# Patient Record
Sex: FEMALE | Race: WHITE | NOT HISPANIC OR LATINO | Employment: OTHER | URBAN - METROPOLITAN AREA
[De-identification: names, ages, dates, MRNs, and addresses within clinical notes are randomized per-mention and may not be internally consistent; named-entity substitution may affect disease eponyms.]

---

## 2017-08-04 ENCOUNTER — OFFICE VISIT (OUTPATIENT)
Dept: ORTHOPEDICS | Facility: CLINIC | Age: 57
End: 2017-08-04
Payer: OTHER GOVERNMENT

## 2017-08-04 VITALS
SYSTOLIC BLOOD PRESSURE: 110 MMHG | HEIGHT: 69 IN | WEIGHT: 140 LBS | HEART RATE: 61 BPM | BODY MASS INDEX: 20.73 KG/M2 | DIASTOLIC BLOOD PRESSURE: 62 MMHG

## 2017-08-04 DIAGNOSIS — M17.11 PATELLOFEMORAL ARTHRITIS OF RIGHT KNEE: ICD-10-CM

## 2017-08-04 DIAGNOSIS — S83.241A TEAR OF MEDIAL MENISCUS OF RIGHT KNEE, CURRENT, UNSPECIFIED TEAR TYPE, INITIAL ENCOUNTER: Primary | ICD-10-CM

## 2017-08-04 PROCEDURE — 3008F BODY MASS INDEX DOCD: CPT | Mod: ,,, | Performed by: ORTHOPAEDIC SURGERY

## 2017-08-04 PROCEDURE — 73562 X-RAY EXAM OF KNEE 3: CPT | Mod: RT,,, | Performed by: ORTHOPAEDIC SURGERY

## 2017-08-04 PROCEDURE — 99213 OFFICE O/P EST LOW 20 MIN: CPT | Mod: 25,,, | Performed by: ORTHOPAEDIC SURGERY

## 2017-08-04 RX ORDER — FERROUS SULFATE, DRIED 160(50) MG
1 TABLET, EXTENDED RELEASE ORAL 2 TIMES DAILY WITH MEALS
COMMUNITY

## 2017-08-04 RX ORDER — RIZATRIPTAN BENZOATE 10 MG/1
10 TABLET ORAL
COMMUNITY
End: 2019-08-26

## 2017-08-04 RX ORDER — IRBESARTAN 150 MG/1
75 TABLET ORAL DAILY
COMMUNITY
Start: 2017-06-18

## 2017-08-04 RX ORDER — TIZANIDINE 4 MG/1
1 TABLET ORAL 3 TIMES DAILY
COMMUNITY
Start: 2017-06-29

## 2017-08-04 RX ORDER — VALACYCLOVIR HYDROCHLORIDE 500 MG/1
1 TABLET, FILM COATED ORAL DAILY
COMMUNITY
Start: 2017-07-01

## 2017-08-04 RX ORDER — SERTRALINE HYDROCHLORIDE 100 MG/1
1 TABLET, FILM COATED ORAL DAILY
COMMUNITY
Start: 2017-06-29

## 2017-08-04 RX ORDER — DICYCLOMINE HYDROCHLORIDE 10 MG/1
1 CAPSULE ORAL EVERY OTHER DAY
COMMUNITY
Start: 2017-06-29 | End: 2017-08-04

## 2017-08-04 RX ORDER — PNV NO.95/FERROUS FUM/FOLIC AC 28MG-0.8MG
100 TABLET ORAL DAILY
COMMUNITY
End: 2019-08-26

## 2017-08-04 RX ORDER — FLUTICASONE PROPIONATE 50 MCG
1 SPRAY, SUSPENSION (ML) NASAL DAILY
COMMUNITY
Start: 2017-06-29 | End: 2019-08-26

## 2017-08-04 NOTE — PROGRESS NOTES
Subjective:       Chief Complaint    Chief Complaint   Patient presents with    Follow-up     bilateral knees.  Would like to restart Supartz injections.  This has been an on going issues for many years.       HPI  Leslie Cotto is a 57 y.o.  female who presents Progressive pain in right knee for the last 3 months. Using BCPs and Advil for pain relief. Pain varies from 2/10-10/10 pain levels. No swelling. Has pain with squatting and stairs. Also gets popping and catching in the knee. The pain can stop her in her tracks.        Past History  Past Medical History:   Diagnosis Date    Ganglion cyst     Hypertension     Primary osteoarthritis of left knee      Past Surgical History:   Procedure Laterality Date    excision of bursa  03/17/2016    left knee    HYSTERECTOMY      KNEE ARTHROSCOPY Left 2003     Social History     Social History    Marital status: Single     Spouse name: N/A    Number of children: N/A    Years of education: N/A     Occupational History    Not on file.     Social History Main Topics    Smoking status: Former Smoker     Quit date: 2004    Smokeless tobacco: Never Used    Alcohol use Yes    Drug use: No    Sexual activity: Not on file     Other Topics Concern    Not on file     Social History Narrative    No narrative on file         Medications  Current Outpatient Prescriptions   Medication Sig    calcium-vitamin D3 500 mg(1,250mg) -200 unit per tablet Take 1 tablet by mouth 2 (two) times daily with meals.    cyanocobalamin (VITAMIN B-12) 100 MCG tablet Take 100 mcg by mouth once daily.    fluticasone (FLONASE) 50 mcg/actuation nasal spray 1 spray by Each Nare route once daily.    irbesartan (AVAPRO) 150 MG tablet Take 1 tablet by mouth once daily.    MV-MINERALS/FA/OMEGA 3,6,9 #3 (WOMEN'S 50+ ADVANCED ORAL) Take 1 tablet by mouth once daily.    polycarbophil (FIBERCON) 625 mg tablet Take 625 mg by mouth once daily.    rizatriptan (MAXALT) 10 MG tablet Take 10 mg by  mouth as needed for Migraine.    sertraline (ZOLOFT) 100 MG tablet Take 1 tablet by mouth once daily.    tizanidine (ZANAFLEX) 4 MG tablet Take 1 tablet by mouth 3 (three) times daily.    valacyclovir (VALTREX) 500 MG tablet Take 1 tablet by mouth once daily.     No current facility-administered medications for this visit.        Allergies  Review of patient's allergies indicates:  No Known Allergies      Review of Systems     Constitutional: Negative    HENT: Negative  Eyes: Negative  Respiratory: Negative  Cardiovascular: Negative  Musculoskeletal: HPI  Skin: Negative  Neurological: Negative  Hematological: Negative  Endocrine: Negative      Physical Exam    Vitals:    08/04/17 0819   BP: 110/62   Pulse: 61     Physical Examination:Right knee-0-135° range of motion. Neutral alignment. Tender medial joint line. Pain with flexion rotation. Stable knee. Moderate patellofemoral crepitance. More so on the right than the left.     Skin-clear  General appearance -  well appearing, and in no distress  Mental status - awake  Neck - supple  Chest -  symmetric air entry  Heart - normal rate   Abdomen - soft      Assessment/Plan   Tear of medial meniscus of right knee, current, unspecified tear type, initial encounter  -     X-Ray Knee 3 View Right; Future; Expected date: 08/04/2017    Patellofemoral arthritis of right knee  -     X-Ray Knee 3 View Right; Future; Expected date: 08/04/2017      Repeat x-rays of her knees reveals normal. Cartilage spaces with mild degenerative change at the patellofemoral joints. The cartilage space and the patellofemoral joints look pretty good also. There is slight narrowing of the cartilage space on the left patellofemoral joint. MRI is to be scheduled for the right knee. It appears she may have a meniscal tear. She has not been able to exercise regularly as previously. Because of not feeling well.She will also like to restart bilateral Supartz injections. If the MRI of her right knee  does not reveal a surgical issue with the meniscus.      This note was dictated using voice recognition software and may contain grammatical errors.

## 2017-08-04 NOTE — LETTER
August 4, 2017      Salinas Craig MD  [address]  [address]  [place] [address]           [facility]  [address]  [address]  [place] [address]  Phone: [phone]  Fax: [phone]          Patient: Leslie Cotto   MR Number: [id number]   YOB: 1960   Date of Visit: 8/4/2017       Dear Dr. Salinas Craig:    Thank you for referring Leslie Cotto to me for evaluation. Attached you will find relevant portions of my assessment and plan of care.    If you have questions, please do not hesitate to call me. I look forward to following Leslie Cotto along with you.    Sincerely,    Raul Barker Jr., MD    Enclosure  CC:  No Recipients    If you would like to receive this communication electronically, please contact [email or web address] or [phone] to request more information on [employer] Link access.    For providers and/or their staff who would like to refer a patient to [facility], please contact us through our one-stop-shop provider referral line, [facility], at [phone].    If you feel you have received this communication in error or would no longer like to receive these types of communications, please e-mail [email or web address]

## 2017-08-15 ENCOUNTER — TELEPHONE (OUTPATIENT)
Dept: ORTHOPEDICS | Facility: CLINIC | Age: 57
End: 2017-08-15

## 2017-08-15 ENCOUNTER — OFFICE VISIT (OUTPATIENT)
Dept: ORTHOPEDICS | Facility: CLINIC | Age: 57
End: 2017-08-15
Payer: OTHER GOVERNMENT

## 2017-08-15 VITALS
HEART RATE: 59 BPM | DIASTOLIC BLOOD PRESSURE: 85 MMHG | HEIGHT: 69 IN | BODY MASS INDEX: 20.73 KG/M2 | SYSTOLIC BLOOD PRESSURE: 127 MMHG | WEIGHT: 140 LBS

## 2017-08-15 DIAGNOSIS — S83.241D OTHER TEAR OF MEDIAL MENISCUS OF RIGHT KNEE AS CURRENT INJURY, SUBSEQUENT ENCOUNTER: ICD-10-CM

## 2017-08-15 DIAGNOSIS — S83.281D OTHER TEAR OF LATERAL MENISCUS OF RIGHT KNEE, UNSPECIFIED WHETHER OLD OR CURRENT TEAR, SUBSEQUENT ENCOUNTER: ICD-10-CM

## 2017-08-15 DIAGNOSIS — S83.241A TEAR OF MEDIAL MENISCUS OF RIGHT KNEE, CURRENT, UNSPECIFIED TEAR TYPE, INITIAL ENCOUNTER: Primary | ICD-10-CM

## 2017-08-15 PROBLEM — S83.281A TEAR OF LATERAL MENISCUS OF RIGHT KNEE: Status: ACTIVE | Noted: 2017-08-15

## 2017-08-15 PROCEDURE — 99213 OFFICE O/P EST LOW 20 MIN: CPT | Mod: ,,, | Performed by: ORTHOPAEDIC SURGERY

## 2017-08-15 PROCEDURE — 3008F BODY MASS INDEX DOCD: CPT | Mod: ,,, | Performed by: ORTHOPAEDIC SURGERY

## 2017-08-15 NOTE — LETTER
August 15, 2017      Salinas Craig MD  [address]  [address]  [place] [address]           [facility]  [address]  [address]  [place] [address]  Phone: [phone]  Fax: [phone]          Patient: Leslie Cotto   MR Number: [id number]   YOB: 1960   Date of Visit: 8/15/2017       Dear Dr. Salinas Craig:    Thank you for referring Leslie Cotto to me for evaluation. Attached you will find relevant portions of my assessment and plan of care.    If you have questions, please do not hesitate to call me. I look forward to following Leslie Cotto along with you.    Sincerely,    Raul Barker Jr., MD    Enclosure  CC:  No Recipients    If you would like to receive this communication electronically, please contact [email or web address] or [phone] to request more information on [employer] Link access.    For providers and/or their staff who would like to refer a patient to [facility], please contact us through our one-stop-shop provider referral line, [facility], at [phone].    If you feel you have received this communication in error or would no longer like to receive these types of communications, please e-mail [email or web address]

## 2017-08-15 NOTE — PROGRESS NOTES
Subjective:       Chief Complaint    Chief Complaint   Patient presents with    Results     Patient is here to review her MRI of the right knee performed on 8/9/17 @ [facility].       HPI  Leslie Cotto is a 57 y.o.  female who presents Follow-up on MRI of the right knee which showed an oblique tear of the medial meniscus with a radial tear of the lateral meniscus. She has been scheduled for arthroscopy on August 31. Preop will be August 30 on Wednesday.      Past History  Past Medical History:   Diagnosis Date    Ganglion cyst     Hypertension     Primary osteoarthritis of left knee      Past Surgical History:   Procedure Laterality Date    excision of bursa  03/17/2016    left knee    HYSTERECTOMY      KNEE ARTHROSCOPY Left 2003     Social History     Social History    Marital status: Single     Spouse name: N/A    Number of children: N/A    Years of education: N/A     Occupational History    Not on file.     Social History Main Topics    Smoking status: Former Smoker     Quit date: 2004    Smokeless tobacco: Never Used    Alcohol use Yes    Drug use: No    Sexual activity: Not on file     Other Topics Concern    Not on file     Social History Narrative    No narrative on file         Medications  Current Outpatient Prescriptions   Medication Sig    calcium-vitamin D3 500 mg(1,250mg) -200 unit per tablet Take 1 tablet by mouth 2 (two) times daily with meals.    cyanocobalamin (VITAMIN B-12) 100 MCG tablet Take 100 mcg by mouth once daily.    fluticasone (FLONASE) 50 mcg/actuation nasal spray 1 spray by Each Nare route once daily.    irbesartan (AVAPRO) 150 MG tablet Take 1 tablet by mouth once daily.    MV-MINERALS/FA/OMEGA 3,6,9 #3 (WOMEN'S 50+ ADVANCED ORAL) Take 1 tablet by mouth once daily.    polycarbophil (FIBERCON) 625 mg tablet Take 625 mg by mouth once daily.    rizatriptan (MAXALT) 10 MG tablet Take 10 mg by mouth as needed for Migraine.    sertraline (ZOLOFT) 100 MG tablet  Take 1 tablet by mouth once daily.    tizanidine (ZANAFLEX) 4 MG tablet Take 1 tablet by mouth 3 (three) times daily.    valacyclovir (VALTREX) 500 MG tablet Take 1 tablet by mouth once daily.     No current facility-administered medications for this visit.        Allergies  Review of patient's allergies indicates:  No Known Allergies      Review of Systems     Constitutional: Negative    HENT: Negative  Eyes: Negative  Respiratory: Negative  Cardiovascular: Negative  Musculoskeletal: HPI  Skin: Negative  Neurological: Negative  Hematological: Negative  Endocrine: Negative      Physical Exam    Vitals:    08/15/17 1059   BP: 127/85   Pulse: (!) 59     Physical Examination:Right knee range of motion 0 145°. Pain with flexion rotation. No swelling. Stable knee. Mild varus.     Skin-clear  General appearance -  well appearing, and in no distress  Mental status - awake  Neck - supple  Chest -  symmetric air entry  Heart - normal rate   Abdomen - soft      Assessment/Plan   Tear of medial meniscus of right knee, current, unspecified tear type, initial encounter    Other tear of lateral meniscus of right knee, unspecified whether old or current tear, subsequent encounter    Other tear of medial meniscus of right knee as current injury, subsequent encounter      Review of the MRI showed an oblique tear. The medial meniscus, right knee. Noted was a small radial tear on the anterior horn of the lateral meniscus. Surgery scheduled August 31. Preop August 30.       This note was dictated using voice recognition software and may contain grammatical errors.

## 2017-08-15 NOTE — TELEPHONE ENCOUNTER
Please add visits to referral # [id number] and schedule appropriate number of visits for bilateral Supartz.

## 2017-08-17 NOTE — TELEPHONE ENCOUNTER
Supartz on hold for the left knee at this time due to having surgery on the right knee on 08/31/2017. Will consider scheduling Supartz to the left knee at a later date.

## 2017-08-21 DIAGNOSIS — S83.241A TEAR OF MEDIAL MENISCUS OF RIGHT KNEE: Primary | ICD-10-CM

## 2017-08-21 DIAGNOSIS — S83.281A TEAR OF LATERAL MENISCUS OF RIGHT KNEE: ICD-10-CM

## 2017-08-30 ENCOUNTER — OFFICE VISIT (OUTPATIENT)
Dept: ORTHOPEDICS | Facility: CLINIC | Age: 57
End: 2017-08-30
Payer: OTHER GOVERNMENT

## 2017-08-30 VITALS
DIASTOLIC BLOOD PRESSURE: 80 MMHG | HEART RATE: 65 BPM | BODY MASS INDEX: 20.73 KG/M2 | SYSTOLIC BLOOD PRESSURE: 128 MMHG | HEIGHT: 69 IN | WEIGHT: 140 LBS

## 2017-08-30 DIAGNOSIS — S83.231D COMPLEX TEAR OF MEDIAL MENISCUS OF RIGHT KNEE, UNSPECIFIED WHETHER OLD OR CURRENT TEAR, SUBSEQUENT ENCOUNTER: Primary | ICD-10-CM

## 2017-08-30 PROBLEM — S83.231A COMPLEX TEAR OF MEDIAL MENISCUS OF RIGHT KNEE: Status: ACTIVE | Noted: 2017-08-30

## 2017-08-30 LAB
ALBUMIN SERPL-MCNC: 4.3 G/DL (ref 3.1–4.7)
ALP SERPL-CCNC: 59 IU/L (ref 40–104)
ALT (SGPT): 21 IU/L (ref 3–33)
AST SERPL-CCNC: 31 IU/L (ref 10–40)
BILIRUB SERPL-MCNC: 0.9 MG/DL (ref 0.3–1)
BUN SERPL-MCNC: 10 MG/DL (ref 8–20)
CALCIUM SERPL-MCNC: 9.8 MG/DL (ref 7.7–10.4)
CHLORIDE: 101 MMOL/L (ref 98–110)
CO2 SERPL-SCNC: 29.1 MMOL/L (ref 22.8–31.6)
CREATININE: 0.66 MG/DL (ref 0.6–1.4)
GLUCOSE: 103 MG/DL (ref 70–99)
HCT VFR BLD AUTO: 44.9 % (ref 36–48)
HGB BLD-MCNC: 14.9 G/DL (ref 12–15)
MCH RBC QN AUTO: 31.2 PG (ref 25–35)
MCHC RBC AUTO-ENTMCNC: 33.2 G/DL (ref 31–36)
MCV RBC AUTO: 93.9 FL (ref 79–98)
NUCLEATED RBCS: 0 %
PLATELET # BLD AUTO: 313 K/UL (ref 140–440)
POTASSIUM SERPL-SCNC: 4.3 MMOL/L (ref 3.5–5)
PROT SERPL-MCNC: 7.1 G/DL (ref 6–8.2)
RBC # BLD AUTO: 4.78 M/UL (ref 3.5–5.5)
SODIUM: 138 MMOL/L (ref 134–144)
WBC # BLD AUTO: 6.6 K/UL (ref 5–10)

## 2017-08-30 PROCEDURE — 99499 UNLISTED E&M SERVICE: CPT | Mod: ,,, | Performed by: ORTHOPAEDIC SURGERY

## 2017-08-30 RX ORDER — OXYCODONE AND ACETAMINOPHEN 5; 325 MG/1; MG/1
1 TABLET ORAL EVERY 4 HOURS PRN
Qty: 60 TABLET | Refills: 0 | Status: SHIPPED | OUTPATIENT
Start: 2017-08-30 | End: 2017-09-08

## 2017-08-30 NOTE — PROGRESS NOTES
Subjective:       Chief Complaint    Chief Complaint   Patient presents with    Pre-op Exam     Patient is scheduled for an arthroscopy right knee w/ possible meniscal repair on 8/31/2017 w/ Dr. Barker.         HPI  Leslie Cotto is a 57 y.o.  female who presents Preop for arthroscopy, right knee. 12 point review of systems negative. Procedure discussed.      Past History  Past Medical History:   Diagnosis Date    Ganglion cyst     Hypertension     Primary osteoarthritis of left knee      Past Surgical History:   Procedure Laterality Date    excision of bursa  03/17/2016    left knee    HYSTERECTOMY      KNEE ARTHROSCOPY Left 2003     Social History     Social History    Marital status: Single     Spouse name: N/A    Number of children: N/A    Years of education: N/A     Occupational History    Not on file.     Social History Main Topics    Smoking status: Former Smoker     Quit date: 2004    Smokeless tobacco: Never Used    Alcohol use Yes    Drug use: No    Sexual activity: Not on file     Other Topics Concern    Not on file     Social History Narrative    No narrative on file         Medications  Current Outpatient Prescriptions   Medication Sig    calcium-vitamin D3 500 mg(1,250mg) -200 unit per tablet Take 1 tablet by mouth 2 (two) times daily with meals.    cyanocobalamin (VITAMIN B-12) 100 MCG tablet Take 100 mcg by mouth once daily.    fluticasone (FLONASE) 50 mcg/actuation nasal spray 1 spray by Each Nare route once daily.    irbesartan (AVAPRO) 150 MG tablet Take 1 tablet by mouth once daily.    MV-MINERALS/FA/OMEGA 3,6,9 #3 (WOMEN'S 50+ ADVANCED ORAL) Take 1 tablet by mouth once daily.    polycarbophil (FIBERCON) 625 mg tablet Take 625 mg by mouth once daily.    rizatriptan (MAXALT) 10 MG tablet Take 10 mg by mouth as needed for Migraine.    sertraline (ZOLOFT) 100 MG tablet Take 1 tablet by mouth once daily.    tizanidine (ZANAFLEX) 4 MG tablet Take 1 tablet by mouth 3  (three) times daily.    valacyclovir (VALTREX) 500 MG tablet Take 1 tablet by mouth once daily.    oxycodone-acetaminophen (PERCOCET) 5-325 mg per tablet Take 1 tablet by mouth every 4 (four) hours as needed for Pain.     No current facility-administered medications for this visit.        Allergies  Review of patient's allergies indicates:  No Known Allergies      Review of Systems     Constitutional: Negative    HENT: Negative  Eyes: Negative  Respiratory: Negative  Cardiovascular: Negative  Musculoskeletal: HPI  Skin: Negative  Neurological: Negative  Hematological: Negative  Endocrine: Negative      Physical Exam    Vitals:    08/30/17 0954   BP: 128/80   Pulse: 65     Physical Examination:Range of motion right knee 0-145°. Mild tenderness at the posterior medial joint line. Mild bogginess. Stable knee. Pain with flexion rotation. Pain with squatting.     Skin-clear  General appearance -  well appearing, and in no distress  Mental status - awake  Neck - supple  Chest -  symmetric air entry  Heart - normal rate   Abdomen - soft      Assessment/Plan   Complex tear of medial meniscus of right knee, unspecified whether old or current tear, subsequent encounter    Other orders  -     oxycodone-acetaminophen (PERCOCET) 5-325 mg per tablet; Take 1 tablet by mouth every 4 (four) hours as needed for Pain.  Dispense: 60 tablet; Refill: 0      Tendency to be nauseated with anesthesia. Schedule first case 08/31/2017      This note was dictated using voice recognition software and may contain grammatical errors.

## 2017-09-01 ENCOUNTER — OFFICE VISIT (OUTPATIENT)
Dept: ORTHOPEDICS | Facility: CLINIC | Age: 57
End: 2017-09-01
Payer: OTHER GOVERNMENT

## 2017-09-01 VITALS
WEIGHT: 140 LBS | BODY MASS INDEX: 20.73 KG/M2 | HEIGHT: 69 IN | SYSTOLIC BLOOD PRESSURE: 141 MMHG | DIASTOLIC BLOOD PRESSURE: 86 MMHG | HEART RATE: 57 BPM

## 2017-09-01 DIAGNOSIS — M17.11 PATELLOFEMORAL ARTHRITIS OF RIGHT KNEE: ICD-10-CM

## 2017-09-01 DIAGNOSIS — Z98.890 POST-OPERATIVE STATE: ICD-10-CM

## 2017-09-01 DIAGNOSIS — S83.241A TEAR OF MEDIAL MENISCUS OF RIGHT KNEE, CURRENT, UNSPECIFIED TEAR TYPE, INITIAL ENCOUNTER: Primary | ICD-10-CM

## 2017-09-01 PROCEDURE — 99024 POSTOP FOLLOW-UP VISIT: CPT | Mod: ,,, | Performed by: ORTHOPAEDIC SURGERY

## 2017-09-01 NOTE — PROGRESS NOTES
Subjective:       Chief Complaint    Chief Complaint   Patient presents with    Right Knee - Post-op Evaluation     Post-op 1 day. DOI: 8/31/2017, arthroscopic partial medial meniscectomy, right knee.  # 2 Arthroscopic chondroplasty of the patellofemoral joint and medial femoral condyle, right knee. She is currently wearing a knee brace and using crutches.       HPI  Leslie Cotto is a 57 y.o.  female who presentsFollow-up on arthroscopic partial medial meniscectomy of the right knee. She is 24 hours. There is to be doing fairly well. Pain level 4/10.       Past History  Past Medical History:   Diagnosis Date    Ganglion cyst     Hypertension     Primary osteoarthritis of left knee      Past Surgical History:   Procedure Laterality Date    excision of bursa  03/17/2016    left knee    HYSTERECTOMY      KNEE ARTHROSCOPY Left 2003     Social History     Social History    Marital status: Single     Spouse name: N/A    Number of children: N/A    Years of education: N/A     Occupational History    Not on file.     Social History Main Topics    Smoking status: Former Smoker     Quit date: 2004    Smokeless tobacco: Never Used    Alcohol use Yes    Drug use: No    Sexual activity: Not on file     Other Topics Concern    Not on file     Social History Narrative    No narrative on file         Medications  Current Outpatient Prescriptions   Medication Sig    calcium-vitamin D3 500 mg(1,250mg) -200 unit per tablet Take 1 tablet by mouth 2 (two) times daily with meals.    cyanocobalamin (VITAMIN B-12) 100 MCG tablet Take 100 mcg by mouth once daily.    fluticasone (FLONASE) 50 mcg/actuation nasal spray 1 spray by Each Nare route once daily.    irbesartan (AVAPRO) 150 MG tablet Take 1 tablet by mouth once daily.    MV-MINERALS/FA/OMEGA 3,6,9 #3 (WOMEN'S 50+ ADVANCED ORAL) Take 1 tablet by mouth once daily.    oxycodone-acetaminophen (PERCOCET) 5-325 mg per tablet Take 1 tablet by mouth every 4 (four)  hours as needed for Pain.    polycarbophil (FIBERCON) 625 mg tablet Take 625 mg by mouth once daily.    rizatriptan (MAXALT) 10 MG tablet Take 10 mg by mouth as needed for Migraine.    sertraline (ZOLOFT) 100 MG tablet Take 1 tablet by mouth once daily.    tizanidine (ZANAFLEX) 4 MG tablet Take 1 tablet by mouth 3 (three) times daily.    valacyclovir (VALTREX) 500 MG tablet Take 1 tablet by mouth once daily.     No current facility-administered medications for this visit.        Allergies  Review of patient's allergies indicates:  No Known Allergies      Review of Systems     Constitutional: Negative    HENT: Negative  Eyes: Negative  Respiratory: Negative  Cardiovascular: Negative  Musculoskeletal: HPI  Skin: Negative  Neurological: Negative  Hematological: Negative  Endocrine: Negative      Physical Exam    Vitals:    09/01/17 1034   BP: (!) 141/86   Pulse: (!) 57     Physical Examination:Right knee range of motion 0-60°. Mild swelling. Tenderness as expected. No drainage.     Skin-Clear  General appearance -  well appearing, and in no distress  Mental status - awake  Neck - supple  Chest -  symmetric air entry  Heart - normal rate   Abdomen - soft      Assessment/Plan   Tear of medial meniscus of right knee, current, unspecified tear type, initial encounter    Patellofemoral arthritis of right knee    Post-operative state      Discontinue knee immobilizer, weightbearing as tolerated. Okay to with the knee. Suture removal in one week.      This note was dictated using voice recognition software and may contain grammatical errors.

## 2017-09-08 ENCOUNTER — OFFICE VISIT (OUTPATIENT)
Dept: ORTHOPEDICS | Facility: CLINIC | Age: 57
End: 2017-09-08
Payer: OTHER GOVERNMENT

## 2017-09-08 VITALS
HEART RATE: 69 BPM | BODY MASS INDEX: 20.73 KG/M2 | SYSTOLIC BLOOD PRESSURE: 116 MMHG | DIASTOLIC BLOOD PRESSURE: 62 MMHG | WEIGHT: 140 LBS | HEIGHT: 69 IN

## 2017-09-08 DIAGNOSIS — Z98.890 POST-OPERATIVE STATE: Primary | ICD-10-CM

## 2017-09-08 DIAGNOSIS — S83.231D COMPLEX TEAR OF MEDIAL MENISCUS OF RIGHT KNEE, UNSPECIFIED WHETHER OLD OR CURRENT TEAR, SUBSEQUENT ENCOUNTER: ICD-10-CM

## 2017-09-08 PROCEDURE — 99024 POSTOP FOLLOW-UP VISIT: CPT | Mod: ,,, | Performed by: ORTHOPAEDIC SURGERY

## 2017-09-08 NOTE — PROGRESS NOTES
Subjective:       Chief Complaint    Chief Complaint   Patient presents with    Post-op Evaluation     Post-op 8 days. DOI: 8/31/2017, arthroscopic partial medial meniscectomy, right knee.  # 2 Arthroscopic chondroplasty of the patellofemoral joint and medial femoral condyle, right knee.  Still has some swelling but she is doing well.         ANN MARIE Cotto is a 57 y.o.  female who presents Follow-up from the arthroscopy 1 week. She was a little concerned of the mass behind her knee, which is the popliteal cyst. Sutures were removed.      Past History  Past Medical History:   Diagnosis Date    Ganglion cyst     Hypertension     Primary osteoarthritis of left knee      Past Surgical History:   Procedure Laterality Date    excision of bursa  03/17/2016    left knee    HYSTERECTOMY      KNEE ARTHROSCOPY Left 2003     Social History     Social History    Marital status: Single     Spouse name: N/A    Number of children: N/A    Years of education: N/A     Occupational History    Not on file.     Social History Main Topics    Smoking status: Former Smoker     Quit date: 2004    Smokeless tobacco: Never Used    Alcohol use Yes    Drug use: No    Sexual activity: Not on file     Other Topics Concern    Not on file     Social History Narrative    No narrative on file         Medications  Current Outpatient Prescriptions   Medication Sig    calcium-vitamin D3 500 mg(1,250mg) -200 unit per tablet Take 1 tablet by mouth 2 (two) times daily with meals.    cyanocobalamin (VITAMIN B-12) 100 MCG tablet Take 100 mcg by mouth once daily.    fluticasone (FLONASE) 50 mcg/actuation nasal spray 1 spray by Each Nare route once daily.    irbesartan (AVAPRO) 150 MG tablet Take 1 tablet by mouth once daily.    MV-MINERALS/FA/OMEGA 3,6,9 #3 (WOMEN'S 50+ ADVANCED ORAL) Take 1 tablet by mouth once daily.    polycarbophil (FIBERCON) 625 mg tablet Take 625 mg by mouth once daily.    rizatriptan (MAXALT) 10 MG tablet  Take 10 mg by mouth as needed for Migraine.    sertraline (ZOLOFT) 100 MG tablet Take 1 tablet by mouth once daily.    tizanidine (ZANAFLEX) 4 MG tablet Take 1 tablet by mouth 3 (three) times daily.    valacyclovir (VALTREX) 500 MG tablet Take 1 tablet by mouth once daily.     No current facility-administered medications for this visit.        Allergies  Review of patient's allergies indicates:  No Known Allergies      Review of Systems     Constitutional: Negative    HENT: Negative  Eyes: Negative  Respiratory: Negative  Cardiovascular: Negative  Musculoskeletal: HPI  Skin: Negative  Neurological: Negative  Hematological: Negative  Endocrine: Negative      Physical Exam    Vitals:    09/08/17 0818   BP: 116/62   Pulse: 69     Physical Examination:     Skin-  General appearance -  well appearing, and in no distress  Mental status - awake  Neck - supple  Chest -  symmetric air entry  Heart - normal rate   Abdomen - soft      Assessment/Plan   Post-operative state    Complex tear of medial meniscus of right knee, unspecified whether old or current tear, subsequent encounter    Urged to begin core exercising with a stationary bike and/or elliptical. She is to avoid a treadmill. Must also avoid squats knee extensions and leg presses.        This note was dictated using voice recognition software and may contain grammatical errors.

## 2017-09-26 ENCOUNTER — OFFICE VISIT (OUTPATIENT)
Dept: ORTHOPEDICS | Facility: CLINIC | Age: 57
End: 2017-09-26
Payer: OTHER GOVERNMENT

## 2017-09-26 VITALS — BODY MASS INDEX: 20.73 KG/M2 | HEIGHT: 69 IN | WEIGHT: 140 LBS

## 2017-09-26 DIAGNOSIS — M17.12 PRIMARY OSTEOARTHRITIS OF LEFT KNEE: Primary | ICD-10-CM

## 2017-09-26 PROCEDURE — 99499 UNLISTED E&M SERVICE: CPT | Mod: ,,, | Performed by: ORTHOPAEDIC SURGERY

## 2017-09-26 PROCEDURE — 20610 DRAIN/INJ JOINT/BURSA W/O US: CPT | Mod: LT,,, | Performed by: ORTHOPAEDIC SURGERY

## 2017-09-26 RX ORDER — CHLORHEXIDINE GLUCONATE ORAL RINSE 1.2 MG/ML
SOLUTION DENTAL DAILY
COMMUNITY
Start: 2017-09-14 | End: 2019-08-26

## 2017-09-26 NOTE — LETTER
September 26, 2017      Salinas Craig MD  [address]  [address]  [place] [address]           [facility]  [address]  [address]  [place] [address]  Phone: [phone]  Fax: [phone]          Patient: Leslie Cotto   MR Number: [id number]   YOB: 1960   Date of Visit: 9/26/2017       Dear Dr. Salinas Craig:    Thank you for referring Leslie Cotto to me for evaluation. Attached you will find relevant portions of my assessment and plan of care.    If you have questions, please do not hesitate to call me. I look forward to following Leslie Cotto along with you.    Sincerely,    Raul Barker Jr., MD    Enclosure  CC:  No Recipients    If you would like to receive this communication electronically, please contact [email or web address] or [phone] to request more information on [employer] Link access.    For providers and/or their staff who would like to refer a patient to [facility], please contact us through our one-stop-shop provider referral line, [facility] [place], at [phone].    If you feel you have received this communication in error or would no longer like to receive these types of communications, please e-mail [email or web address]

## 2017-09-26 NOTE — PROGRESS NOTES
Postop follow-up arthroscopy of the right knee on 8:30 Subjective:       Chief Complaint    Chief Complaint   Patient presents with    Injections     # 1 left knee Supartz.       HPI  Leslie Cotto is a 57 y.o.  female who presents Follow-up arthroscopy right knee, 08/31/2017. Upper extent of the medial meniscus was encountered that time and partial meniscectomy was accomplished. She also has a sig no fix and popliteal cyst in that area measuring 5.6 cm x 3.1 cm at its largest point. She is currently rehabbing the knee with stationary bike, which she rides half hour every other day      Past History  Past Medical History:   Diagnosis Date    Ganglion cyst     Hypertension     Primary osteoarthritis of left knee      Past Surgical History:   Procedure Laterality Date    excision of bursa  03/17/2016    left knee    HYSTERECTOMY      KNEE ARTHROSCOPY Left 2003     Social History     Social History    Marital status: Single     Spouse name: N/A    Number of children: N/A    Years of education: N/A     Occupational History    Not on file.     Social History Main Topics    Smoking status: Former Smoker     Quit date: 2004    Smokeless tobacco: Never Used    Alcohol use Yes    Drug use: No    Sexual activity: Not on file     Other Topics Concern    Not on file     Social History Narrative    No narrative on file         Medications  Current Outpatient Prescriptions   Medication Sig    calcium-vitamin D3 500 mg(1,250mg) -200 unit per tablet Take 1 tablet by mouth 2 (two) times daily with meals.    chlorhexidine (PERIDEX) 0.12 % solution once daily.    cyanocobalamin (VITAMIN B-12) 100 MCG tablet Take 100 mcg by mouth once daily.    fluticasone (FLONASE) 50 mcg/actuation nasal spray 1 spray by Each Nare route once daily.    irbesartan (AVAPRO) 150 MG tablet Take 1 tablet by mouth once daily.    MV-MINERALS/FA/OMEGA 3,6,9 #3 (WOMEN'S 50+ ADVANCED ORAL) Take 1 tablet by mouth once daily.     polycarbophil (FIBERCON) 625 mg tablet Take 625 mg by mouth once daily.    rizatriptan (MAXALT) 10 MG tablet Take 10 mg by mouth as needed for Migraine.    sertraline (ZOLOFT) 100 MG tablet Take 1 tablet by mouth once daily.    tizanidine (ZANAFLEX) 4 MG tablet Take 1 tablet by mouth 3 (three) times daily.    valacyclovir (VALTREX) 500 MG tablet Take 1 tablet by mouth once daily.     Current Facility-Administered Medications   Medication    SUPARTZ injection Syrg 25 mg       Allergies  Review of patient's allergies indicates:  No Known Allergies      Review of Systems     Constitutional: Negative    HENT: Negative  Eyes: Negative  Respiratory: Negative  Cardiovascular: Negative  Musculoskeletal: HPI  Skin: Negative  Neurological: Negative  Hematological: Negative  Endocrine: Negative      Physical Exam    There were no vitals filed for this visit.  Physical Examination: Right knee reveals range of motion 0 130°. Mild tenderness. Patellofemoral crepitance is present. The popliteal cyst is palpable posteriorly       Skin-clear  General appearance -  well appearing, and in no distress  Mental status - awake  Neck - supple  Chest -  symmetric air entry  Heart - normal rate   Abdomen - soft      Assessment/Plan   Primary osteoarthritis of left knee  -     Large Joint Aspiration/Injection  -     SUPARTZ injection Syrg 25 mg; 25 mg.      Two-view with a stationary bike exercises. Appears to be progressing satisfactorily      This note was dictated using voice recognition software and may contain grammatical errors.

## 2017-09-26 NOTE — PROCEDURES
Large Joint Aspiration/Injection  Date/Time: 9/26/2017 9:37 AM  Performed by: NORA KNAPP JR  Authorized by: NORA KNAPP JR     Consent Done?:  Yes (Verbal)  Indications:  Pain  Procedure site marked: Yes    Timeout: Prior to procedure the correct patient, procedure, and site was verified      Location:  Knee  Site:  L knee  Prep: Patient was prepped and draped in usual sterile fashion    Ultrasonic Guidance for needle placement: No  Needle size:  22 G  Approach:  Lateral  Medications:  25 mg SUPARTZ 10 mg/mL  Patient tolerance:  Patient tolerated the procedure well with no immediate complications

## 2017-10-03 ENCOUNTER — OFFICE VISIT (OUTPATIENT)
Dept: ORTHOPEDICS | Facility: CLINIC | Age: 57
End: 2017-10-03
Payer: OTHER GOVERNMENT

## 2017-10-03 VITALS — BODY MASS INDEX: 20.73 KG/M2 | HEIGHT: 69 IN | WEIGHT: 140 LBS

## 2017-10-03 DIAGNOSIS — M17.12 PRIMARY OSTEOARTHRITIS OF LEFT KNEE: Primary | ICD-10-CM

## 2017-10-03 PROCEDURE — 20610 DRAIN/INJ JOINT/BURSA W/O US: CPT | Mod: LT,,, | Performed by: ORTHOPAEDIC SURGERY

## 2017-10-03 PROCEDURE — 99499 UNLISTED E&M SERVICE: CPT | Mod: ,,, | Performed by: ORTHOPAEDIC SURGERY

## 2017-10-03 NOTE — PROCEDURES
Large Joint Aspiration/Injection  Date/Time: 10/3/2017 4:12 PM  Performed by: NORA KNAPP JR  Authorized by: NORA KNAPP JR     Consent Done?:  Yes (Verbal)  Indications:  Pain  Procedure site marked: Yes    Timeout: Prior to procedure the correct patient, procedure, and site was verified      Location:  Knee  Site:  L knee  Prep: Patient was prepped and draped in usual sterile fashion    Ultrasonic Guidance for needle placement: No  Needle size:  22 G  Approach:  Lateral  Medications:  25 mg SUPARTZ 10 mg/mL  Patient tolerance:  Patient tolerated the procedure well with no immediate complications

## 2017-10-03 NOTE — LETTER
October 3, 2017      Salinas Craig MD  [address]  [address]  [place] [address]           [facility] Orthopedic Surgery  [address]  [address]  [place] [address]  Phone: [phone]  Fax: [phone]          Patient: Leslie Cotto   MR Number: [id number]   YOB: 1960   Date of Visit: 10/3/2017       Dear Dr. Salinas Craig:    Thank you for referring Leslie Cotto to me for evaluation. Attached you will find relevant portions of my assessment and plan of care.    If you have questions, please do not hesitate to call me. I look forward to following Leslie Cotto along with you.    Sincerely,    Raul Barker Jr., MD    Enclosure  CC:  No Recipients    If you would like to receive this communication electronically, please contact [email or web address] or [phone] to request more information on [employer] Link access.    For providers and/or their staff who would like to refer a patient to [facility], please contact us through our one-stop-shop provider referral line, [facility], at [phone].    If you feel you have received this communication in error or would no longer like to receive these types of communications, please e-mail [email or web address]

## 2017-10-10 ENCOUNTER — OFFICE VISIT (OUTPATIENT)
Dept: ORTHOPEDICS | Facility: CLINIC | Age: 57
End: 2017-10-10
Payer: OTHER GOVERNMENT

## 2017-10-10 VITALS — BODY MASS INDEX: 20.73 KG/M2 | WEIGHT: 140 LBS | HEIGHT: 69 IN

## 2017-10-10 DIAGNOSIS — M17.12 PRIMARY OSTEOARTHRITIS OF LEFT KNEE: Primary | ICD-10-CM

## 2017-10-10 PROCEDURE — 20610 DRAIN/INJ JOINT/BURSA W/O US: CPT | Mod: LT,,, | Performed by: ORTHOPAEDIC SURGERY

## 2017-10-10 PROCEDURE — 99499 UNLISTED E&M SERVICE: CPT | Mod: ,,, | Performed by: ORTHOPAEDIC SURGERY

## 2017-10-10 RX ORDER — LOTEPREDNOL ETABONATE 5 MG/G
GEL OPHTHALMIC
COMMUNITY
Start: 2017-10-09 | End: 2019-08-26

## 2017-10-10 NOTE — LETTER
October 10, 2017      Salinas Craig MD  [address]  [address]  [place] [address]           [facility] Orthopedic Surgery  [address]  [address]  [facility] [address]  Phone: [phone]  Fax: [phone]          Patient: Leslie Cotto   MR Number: [id number]   YOB: 1960   Date of Visit: 10/10/2017       Dear Dr. Salinas Craig:    Thank you for referring Leslie Cotto to me for evaluation. Attached you will find relevant portions of my assessment and plan of care.    If you have questions, please do not hesitate to call me. I look forward to following Leslie Cotto along with you.    Sincerely,    Raul Barker Jr., MD    Enclosure  CC:  No Recipients    If you would like to receive this communication electronically, please contact [email or web address] or [phone] to request more information on [employer] Link access.    For providers and/or their staff who would like to refer a patient to [facility], please contact us through our one-stop-shop provider referral line, [facility], at [phone].    If you feel you have received this communication in error or would no longer like to receive these types of communications, please e-mail [email or web address]

## 2017-10-10 NOTE — PROCEDURES
Large Joint Aspiration/Injection  Date/Time: 10/10/2017 9:09 AM  Performed by: NORA KNAPP JR  Authorized by: NORA KNAPP JR     Consent Done?:  Yes (Verbal)  Indications:  Pain  Procedure site marked: Yes    Timeout: Prior to procedure the correct patient, procedure, and site was verified      Location:  Knee  Site:  L knee  Prep: Patient was prepped and draped in usual sterile fashion    Ultrasonic Guidance for needle placement: No  Needle size:  22 G  Approach:  Lateral  Medications:  25 mg SUPARTZ 10 mg/mL  Patient tolerance:  Patient tolerated the procedure well with no immediate complications

## 2017-10-17 ENCOUNTER — OFFICE VISIT (OUTPATIENT)
Dept: ORTHOPEDICS | Facility: CLINIC | Age: 57
End: 2017-10-17
Payer: OTHER GOVERNMENT

## 2017-10-17 VITALS
WEIGHT: 140 LBS | HEART RATE: 60 BPM | DIASTOLIC BLOOD PRESSURE: 60 MMHG | SYSTOLIC BLOOD PRESSURE: 116 MMHG | BODY MASS INDEX: 20.73 KG/M2 | HEIGHT: 69 IN

## 2017-10-17 DIAGNOSIS — M17.12 PRIMARY OSTEOARTHRITIS OF LEFT KNEE: ICD-10-CM

## 2017-10-17 DIAGNOSIS — S83.231D COMPLEX TEAR OF MEDIAL MENISCUS OF RIGHT KNEE, UNSPECIFIED WHETHER OLD OR CURRENT TEAR, SUBSEQUENT ENCOUNTER: ICD-10-CM

## 2017-10-17 DIAGNOSIS — S83.281D OTHER TEAR OF LATERAL MENISCUS OF RIGHT KNEE, UNSPECIFIED WHETHER OLD OR CURRENT TEAR, SUBSEQUENT ENCOUNTER: ICD-10-CM

## 2017-10-17 DIAGNOSIS — Z98.890 POST-OPERATIVE STATE: Primary | ICD-10-CM

## 2017-10-17 PROCEDURE — 99024 POSTOP FOLLOW-UP VISIT: CPT | Mod: ,,, | Performed by: ORTHOPAEDIC SURGERY

## 2017-10-17 NOTE — PROGRESS NOTES
Subjective:       Chief Complaint    Chief Complaint   Patient presents with    Post-op Evaluation     6 weeks & 5 days, right knee.  DOS: 8/31/2017, arthroscopic partial medial meniscectomy, right knee.  # 2 Arthroscopic chondroplasty of the patellofemoral joint and medial femoral condyle, right knee.  Patient states she is doing well.  Has some pain and discomfort off an on  but overall doing well.        HPI  Leslie Cotto is a 57 y.o.  female who presents Almost 7 weeks postop. Using stationary bike for rehabilitation and doing well.      Past History  Past Medical History:   Diagnosis Date    Ganglion cyst     Hypertension     Primary osteoarthritis of left knee      Past Surgical History:   Procedure Laterality Date    excision of bursa  03/17/2016    left knee    HYSTERECTOMY      KNEE ARTHROSCOPY Left 2003     Social History     Social History    Marital status: Single     Spouse name: N/A    Number of children: N/A    Years of education: N/A     Occupational History    Not on file.     Social History Main Topics    Smoking status: Former Smoker     Quit date: 2004    Smokeless tobacco: Never Used    Alcohol use Yes    Drug use: No    Sexual activity: Not on file     Other Topics Concern    Not on file     Social History Narrative    No narrative on file         Medications  Current Outpatient Prescriptions   Medication Sig    calcium-vitamin D3 500 mg(1,250mg) -200 unit per tablet Take 1 tablet by mouth 2 (two) times daily with meals.    chlorhexidine (PERIDEX) 0.12 % solution once daily.    cyanocobalamin (VITAMIN B-12) 100 MCG tablet Take 100 mcg by mouth once daily.    fluticasone (FLONASE) 50 mcg/actuation nasal spray 1 spray by Each Nare route once daily.    irbesartan (AVAPRO) 150 MG tablet Take 1 tablet by mouth once daily.    LOTEMAX 0.5 % DrpG As directed    MV-MINERALS/FA/OMEGA 3,6,9 #3 (WOMEN'S 50+ ADVANCED ORAL) Take 1 tablet by mouth once daily.    polycarbophil  (FIBERCON) 625 mg tablet Take 625 mg by mouth once daily.    rizatriptan (MAXALT) 10 MG tablet Take 10 mg by mouth as needed for Migraine.    sertraline (ZOLOFT) 100 MG tablet Take 1 tablet by mouth once daily.    tizanidine (ZANAFLEX) 4 MG tablet Take 1 tablet by mouth 3 (three) times daily.    valacyclovir (VALTREX) 500 MG tablet Take 1 tablet by mouth once daily.     No current facility-administered medications for this visit.        Allergies  Review of patient's allergies indicates:  No Known Allergies      Review of Systems     Constitutional: Negative    HENT: Negative  Eyes: Negative  Respiratory: Negative  Cardiovascular: Negative  Musculoskeletal: HPI  Skin: Negative  Neurological: Negative  Hematological: Negative  Endocrine: Negative      Physical Exam    Vitals:    10/17/17 0846   BP: 116/60   Pulse: 60     Physical Examination:Range of motion right knee 0-130° plus. Minimal swelling. Patellofemoral crepitance is present bilaterally, slightly less on the right as compared to the left.     Skin-clear  General appearance -  well appearing, and in no distress  Mental status - awake  Neck - supple  Chest -  symmetric air entry  Heart - normal rate   Abdomen - soft      Assessment/Plan   Post-operative state    Complex tear of medial meniscus of right knee, unspecified whether old or current tear, subsequent encounter    Other tear of lateral meniscus of right knee, unspecified whether old or current tear, subsequent encounter    Primary osteoarthritis of left knee      Continued rehabilitation program. Follow-up in 3 weeks.      This note was dictated using voice recognition software and may contain grammatical errors.

## 2017-11-07 ENCOUNTER — OFFICE VISIT (OUTPATIENT)
Dept: ORTHOPEDICS | Facility: CLINIC | Age: 57
End: 2017-11-07
Payer: OTHER GOVERNMENT

## 2017-11-07 VITALS
DIASTOLIC BLOOD PRESSURE: 109 MMHG | SYSTOLIC BLOOD PRESSURE: 157 MMHG | WEIGHT: 140 LBS | HEIGHT: 69 IN | BODY MASS INDEX: 20.73 KG/M2 | HEART RATE: 58 BPM

## 2017-11-07 DIAGNOSIS — Z98.890 POST-OPERATIVE STATE: Primary | ICD-10-CM

## 2017-11-07 DIAGNOSIS — M17.12 PRIMARY OSTEOARTHRITIS OF LEFT KNEE: ICD-10-CM

## 2017-11-07 DIAGNOSIS — S83.231D COMPLEX TEAR OF MEDIAL MENISCUS OF RIGHT KNEE, UNSPECIFIED WHETHER OLD OR CURRENT TEAR, SUBSEQUENT ENCOUNTER: ICD-10-CM

## 2017-11-07 PROCEDURE — 99024 POSTOP FOLLOW-UP VISIT: CPT | Mod: ,,, | Performed by: ORTHOPAEDIC SURGERY

## 2017-11-07 RX ORDER — PROMETHAZINE HYDROCHLORIDE 12.5 MG/1
TABLET ORAL
COMMUNITY
Start: 2017-11-02 | End: 2019-08-26

## 2017-11-07 RX ORDER — HYDROCODONE BITARTRATE AND ACETAMINOPHEN 7.5; 325 MG/1; MG/1
TABLET ORAL
COMMUNITY
Start: 2017-11-02 | End: 2019-08-26

## 2017-11-07 RX ORDER — DOXYCYCLINE 100 MG/1
CAPSULE ORAL DAILY
COMMUNITY
Start: 2017-11-02 | End: 2019-08-26

## 2017-11-07 NOTE — PROGRESS NOTES
Subjective:       Chief Complaint    Chief Complaint   Patient presents with    Post-op Evaluation     9 weeks and 5 days, post-op. DOS: 8/31/2017, arthroscopic partial medial meniscectomy, right knee.  # 2 Arthroscopic chondroplasty of the patellofemoral joint and medial femoral condyle, right knee.       HPI  Leslie Cotto is a 57 y.o.  female who presents Follow-up on right knee surgery. Appears to be doing better. Right lower extremity is always a little larger than the left due to patient treatment. She had radiation therapy for cervical cancer.      Past History  Past Medical History:   Diagnosis Date    Ganglion cyst     Hypertension     Primary osteoarthritis of left knee      Past Surgical History:   Procedure Laterality Date    excision of bursa  03/17/2016    left knee    HYSTERECTOMY      KNEE ARTHROSCOPY Left 2003     Social History     Social History    Marital status: Single     Spouse name: N/A    Number of children: N/A    Years of education: N/A     Occupational History    Not on file.     Social History Main Topics    Smoking status: Former Smoker     Quit date: 2004    Smokeless tobacco: Never Used    Alcohol use Yes    Drug use: No    Sexual activity: Not on file     Other Topics Concern    Not on file     Social History Narrative    No narrative on file         Medications  Current Outpatient Prescriptions   Medication Sig    calcium-vitamin D3 500 mg(1,250mg) -200 unit per tablet Take 1 tablet by mouth 2 (two) times daily with meals.    chlorhexidine (PERIDEX) 0.12 % solution once daily.    cyanocobalamin (VITAMIN B-12) 100 MCG tablet Take 100 mcg by mouth once daily.    doxycycline (VIBRAMYCIN) 100 MG Cap once daily.    fluticasone (FLONASE) 50 mcg/actuation nasal spray 1 spray by Each Nare route once daily.    hydrocodone-acetaminophen 7.5-325mg (NORCO) 7.5-325 mg per tablet as needed.    irbesartan (AVAPRO) 150 MG tablet Take 1 tablet by mouth once daily.     LOTEMAX 0.5 % DrpG As directed    MV-MINERALS/FA/OMEGA 3,6,9 #3 (WOMEN'S 50+ ADVANCED ORAL) Take 1 tablet by mouth once daily.    polycarbophil (FIBERCON) 625 mg tablet Take 625 mg by mouth once daily.    promethazine (PHENERGAN) 12.5 MG Tab as needed.    rizatriptan (MAXALT) 10 MG tablet Take 10 mg by mouth as needed for Migraine.    sertraline (ZOLOFT) 100 MG tablet Take 1 tablet by mouth once daily.    tizanidine (ZANAFLEX) 4 MG tablet Take 1 tablet by mouth 3 (three) times daily.    valacyclovir (VALTREX) 500 MG tablet Take 1 tablet by mouth once daily.     No current facility-administered medications for this visit.        Allergies  Review of patient's allergies indicates:  No Known Allergies      Review of Systems     Constitutional: Negative    HENT: Negative  Eyes: Negative  Respiratory: Negative  Cardiovascular: Negative  Musculoskeletal: HPI  Skin: Negative  Neurological: Negative  Hematological: Negative  Endocrine: Negative      Physical Exam    Vitals:    11/07/17 0959   BP: (!) 157/109   Pulse: (!) 58     Physical Examination:Examination right knee reveals no effusion. Mild to moderate patellofemoral crepitance. Range of motion 0-138°. Stable.     Skin-  General appearance -  well appearing, and in no distress  Mental status - awake  Neck - supple  Chest -  symmetric air entry  Heart - normal rate   Abdomen - soft      Assessment/Plan   Post-operative state    Primary osteoarthritis of left knee    Complex tear of medial meniscus of right knee, unspecified whether old or current tear, subsequent encounter      She is planning to return to work 11/21/2017.      This note was dictated using voice recognition software and may contain grammatical errors.

## 2018-02-07 ENCOUNTER — OFFICE VISIT (OUTPATIENT)
Dept: ORTHOPEDICS | Facility: CLINIC | Age: 58
End: 2018-02-07
Payer: OTHER GOVERNMENT

## 2018-02-07 VITALS — WEIGHT: 140 LBS | BODY MASS INDEX: 20.73 KG/M2 | HEIGHT: 69 IN

## 2018-02-07 DIAGNOSIS — M17.12 PRIMARY OSTEOARTHRITIS OF LEFT KNEE: ICD-10-CM

## 2018-02-07 DIAGNOSIS — M17.11 PATELLOFEMORAL ARTHRITIS OF RIGHT KNEE: Primary | ICD-10-CM

## 2018-02-07 PROCEDURE — 99212 OFFICE O/P EST SF 10 MIN: CPT | Mod: ,,, | Performed by: ORTHOPAEDIC SURGERY

## 2018-02-07 PROCEDURE — 3008F BODY MASS INDEX DOCD: CPT | Mod: ,,, | Performed by: ORTHOPAEDIC SURGERY

## 2018-02-07 NOTE — PROGRESS NOTES
Subjective:       Chief Complaint    Chief Complaint   Patient presents with    Follow-up      DOS: 8/31/2017, 6 month s/p arthroscopic partial medial meniscectomy, right knee.  # 2 Arthroscopic chondroplasty of the patellofemoral joint and medial femoral condyle. Patient states she is doing well.         HPI  Leslie Cotto is a 57 y.o.  female who presents Follow-up on arthritic right knee. Overall, doing very well. Is looking at buying an elliptical. Not comfortable with the stationary bike.      Past History  Past Medical History:   Diagnosis Date    Ganglion cyst     Hypertension     Primary osteoarthritis of left knee      Past Surgical History:   Procedure Laterality Date    excision of bursa  03/17/2016    left knee    HYSTERECTOMY      KNEE ARTHROSCOPY Left 2003     Social History     Social History    Marital status: Single     Spouse name: N/A    Number of children: N/A    Years of education: N/A     Occupational History    Not on file.     Social History Main Topics    Smoking status: Former Smoker     Quit date: 2004    Smokeless tobacco: Never Used    Alcohol use Yes    Drug use: No    Sexual activity: Not on file     Other Topics Concern    Not on file     Social History Narrative    No narrative on file         Medications  Current Outpatient Prescriptions   Medication Sig    calcium-vitamin D3 500 mg(1,250mg) -200 unit per tablet Take 1 tablet by mouth 2 (two) times daily with meals.    chlorhexidine (PERIDEX) 0.12 % solution once daily.    cyanocobalamin (VITAMIN B-12) 100 MCG tablet Take 100 mcg by mouth once daily.    doxycycline (VIBRAMYCIN) 100 MG Cap once daily.    fluticasone (FLONASE) 50 mcg/actuation nasal spray 1 spray by Each Nare route once daily.    hydrocodone-acetaminophen 7.5-325mg (NORCO) 7.5-325 mg per tablet as needed.    irbesartan (AVAPRO) 150 MG tablet Take 1 tablet by mouth once daily.    LOTEMAX 0.5 % DrpG As directed    MV-MINERALS/FA/OMEGA 3,6,9  #3 (WOMEN'S 50+ ADVANCED ORAL) Take 1 tablet by mouth once daily.    polycarbophil (FIBERCON) 625 mg tablet Take 625 mg by mouth once daily.    promethazine (PHENERGAN) 12.5 MG Tab as needed.    rizatriptan (MAXALT) 10 MG tablet Take 10 mg by mouth as needed for Migraine.    sertraline (ZOLOFT) 100 MG tablet Take 1 tablet by mouth once daily.    tizanidine (ZANAFLEX) 4 MG tablet Take 1 tablet by mouth 3 (three) times daily.    valacyclovir (VALTREX) 500 MG tablet Take 1 tablet by mouth once daily.     No current facility-administered medications for this visit.        Allergies  Review of patient's allergies indicates:  No Known Allergies      Review of Systems     Constitutional: Negative    HENT: Negative  Eyes: Negative  Respiratory: Negative  Cardiovascular: Negative  Musculoskeletal: HPI  Skin: Negative  Neurological: Negative  Hematological: Negative  Endocrine: Negative      Physical Exam    There were no vitals filed for this visit.  Physical Examination:Right knee examination. Range of motion 0-135°. +2. Patellofemoral crepitance with no swelling or joint line tenderness.     Skin- clear  General appearance -  well appearing, and in no distress  Mental status - awake  Neck - supple  Chest -  symmetric air entry  Heart - normal rate   Abdomen - soft      Assessment/Plan   Patellofemoral arthritis of right knee    Primary osteoarthritis of left knee        Encouraged to pursue core exercise on a regular basis. Currently not interested in Supartz injections.    This note was dictated using voice recognition software and may contain grammatical errors.

## 2018-02-07 NOTE — LETTER
February 7, 2018      Salinas Craig MD  [address]  [address]  [place] [address]           [facility] Orthopedic Surgery  [address]  [address]  [place] [address]  Phone: [phone]  Fax: [phone]          Patient: Leslie Cotto   MR Number: [id number]   YOB: 1960   Date of Visit: 2/7/2018       Dear Dr. Salinas Craig:    Thank you for referring Leslie Cotto to me for evaluation. Attached you will find relevant portions of my assessment and plan of care.    If you have questions, please do not hesitate to call me. I look forward to following Leslie Cotto along with you.    Sincerely,    Raul Barker Jr., MD    Enclosure  CC:  No Recipients    If you would like to receive this communication electronically, please contact [email or web address] or [phone] to request more information on [employer] Link access.    For providers and/or their staff who would like to refer a patient to [facility], please contact us through our one-stop-shop provider referral line, [facility], at [phone].    If you feel you have received this communication in error or would no longer like to receive these types of communications, please e-mail [email or web address]

## 2018-03-13 ENCOUNTER — OFFICE VISIT (OUTPATIENT)
Dept: ORTHOPEDICS | Facility: CLINIC | Age: 58
End: 2018-03-13
Payer: OTHER GOVERNMENT

## 2018-03-13 VITALS
HEART RATE: 60 BPM | BODY MASS INDEX: 20.73 KG/M2 | HEIGHT: 69 IN | DIASTOLIC BLOOD PRESSURE: 88 MMHG | SYSTOLIC BLOOD PRESSURE: 130 MMHG | WEIGHT: 140 LBS

## 2018-03-13 DIAGNOSIS — M17.0 PRIMARY OSTEOARTHRITIS OF BOTH KNEES: Primary | ICD-10-CM

## 2018-03-13 PROCEDURE — 99213 OFFICE O/P EST LOW 20 MIN: CPT | Mod: ,,, | Performed by: ORTHOPAEDIC SURGERY

## 2018-03-13 NOTE — LETTER
March 13, 2018      Salinas Craig MD  [address]  [address]  [place] [address]           [facility] Orthopedic Surgery  [address]  [address]  [place] [address]  Phone: [phone]  Fax: [phone]          Patient: Leslie Cotto   MR Number: [id number]   YOB: 1960   Date of Visit: 3/13/2018       Dear Dr. Salinas Craig:    Thank you for referring Leslie Cotto to me for evaluation. Attached you will find relevant portions of my assessment and plan of care.    If you have questions, please do not hesitate to call me. I look forward to following Leslie Cotto along with you.    Sincerely,    Raul Barker Jr., MD    Enclosure  CC:  No Recipients    If you would like to receive this communication electronically, please contact [email or web address] or [phone] to request more information on [employer] Link access.    For providers and/or their staff who would like to refer a patient to [facility], please contact us through our one-stop-shop provider referral line, [facility], at [phone].    If you feel you have received this communication in error or would no longer like to receive these types of communications, please e-mail [email or web address]

## 2018-03-14 ENCOUNTER — TELEPHONE (OUTPATIENT)
Dept: ORTHOPEDICS | Facility: CLINIC | Age: 58
End: 2018-03-14

## 2018-03-14 NOTE — PROGRESS NOTES
Subjective:       Chief Complaint    Chief Complaint   Patient presents with    Follow-up     right knee.  DOS: 8/31/2017, 6 month s/p arthroscopic partial medial meniscectomy, right knee.  # 2 Arthroscopic chondroplasty of the patellofemoral joint and medial femoral condyle.  Patient would like to discuss restarting Supartz injections.  Patient reports her knee is starting to bother her again.       HPI  Leslie Cotto is a 57 y.o.  female who presents Follow-up on both knees would like to restart bilateral Supartz injections. The right knee is the worst.      Past History  Past Medical History:   Diagnosis Date    Ganglion cyst     Hypertension     Primary osteoarthritis of left knee      Past Surgical History:   Procedure Laterality Date    excision of bursa  03/17/2016    left knee    HYSTERECTOMY      KNEE ARTHROSCOPY Left 2003     Social History     Social History    Marital status: Single     Spouse name: N/A    Number of children: N/A    Years of education: N/A     Occupational History    Not on file.     Social History Main Topics    Smoking status: Former Smoker     Quit date: 2004    Smokeless tobacco: Never Used    Alcohol use Yes    Drug use: No    Sexual activity: Not on file     Other Topics Concern    Not on file     Social History Narrative    No narrative on file         Medications  Current Outpatient Prescriptions   Medication Sig    calcium-vitamin D3 500 mg(1,250mg) -200 unit per tablet Take 1 tablet by mouth 2 (two) times daily with meals.    chlorhexidine (PERIDEX) 0.12 % solution once daily.    cyanocobalamin (VITAMIN B-12) 100 MCG tablet Take 100 mcg by mouth once daily.    doxycycline (VIBRAMYCIN) 100 MG Cap once daily.    fluticasone (FLONASE) 50 mcg/actuation nasal spray 1 spray by Each Nare route once daily.    hydrocodone-acetaminophen 7.5-325mg (NORCO) 7.5-325 mg per tablet as needed.    irbesartan (AVAPRO) 150 MG tablet Take 1 tablet by mouth once daily.     LOTEMAX 0.5 % DrpG As directed    MV-MINERALS/FA/OMEGA 3,6,9 #3 (WOMEN'S 50+ ADVANCED ORAL) Take 1 tablet by mouth once daily.    polycarbophil (FIBERCON) 625 mg tablet Take 625 mg by mouth once daily.    promethazine (PHENERGAN) 12.5 MG Tab as needed.    rizatriptan (MAXALT) 10 MG tablet Take 10 mg by mouth as needed for Migraine.    sertraline (ZOLOFT) 100 MG tablet Take 1 tablet by mouth once daily.    tizanidine (ZANAFLEX) 4 MG tablet Take 1 tablet by mouth 3 (three) times daily.    valacyclovir (VALTREX) 500 MG tablet Take 1 tablet by mouth once daily.     No current facility-administered medications for this visit.        Allergies  Review of patient's allergies indicates:  No Known Allergies      Review of Systems     Constitutional: Negative    HENT: Negative  Eyes: Negative  Respiratory: Negative  Cardiovascular: Negative  Musculoskeletal: HPI  Skin: Negative  Neurological: Negative  Hematological: Negative  Endocrine: Negative      Physical Exam    Vitals:    03/13/18 1624   BP: 130/88   Pulse: 60     Physical Examination:Range of motion of both knees is 130° plus the right knee is boggy. Tenderness at the medial joint line. Some tenderness laterally. The left knee is tender at the medial joint line. Both knees in varus. Mild patellofemoral crepitance is present.     Skin-clear  General appearance -  well appearing, and in no distress  Mental status - awake  Neck - supple  Chest -  symmetric air entry  Heart - normal rate   Abdomen - soft      Assessment/Plan   Primary osteoarthritis of both knees      Schedule bilateral Supartz injections.      This note was dictated using voice recognition software and may contain grammatical errors.

## 2018-04-10 ENCOUNTER — OFFICE VISIT (OUTPATIENT)
Dept: ORTHOPEDICS | Facility: CLINIC | Age: 58
End: 2018-04-10
Payer: OTHER GOVERNMENT

## 2018-04-10 VITALS — WEIGHT: 140 LBS | HEIGHT: 69 IN | BODY MASS INDEX: 20.73 KG/M2

## 2018-04-10 DIAGNOSIS — M17.0 PRIMARY OSTEOARTHRITIS OF BOTH KNEES: Primary | ICD-10-CM

## 2018-04-10 PROCEDURE — 99499 UNLISTED E&M SERVICE: CPT | Mod: ,,, | Performed by: ORTHOPAEDIC SURGERY

## 2018-04-10 PROCEDURE — 20610 DRAIN/INJ JOINT/BURSA W/O US: CPT | Mod: 50,,, | Performed by: ORTHOPAEDIC SURGERY

## 2018-04-10 NOTE — PROCEDURES
Large Joint Aspiration/Injection  Date/Time: 4/10/2018 5:42 PM  Performed by: NORA KNAPP JR  Authorized by: NORA KNAPP JR     Consent Done?:  Yes (Verbal)  Indications:  Pain  Procedure site marked: Yes    Timeout: Prior to procedure the correct patient, procedure, and site was verified      Location:  Knee  Site:  R knee and L knee (joints)  Prep: Patient was prepped and draped in usual sterile fashion    Ultrasonic Guidance for needle placement: No  Needle size:  22 G  Approach:  Lateral  Medications:  25 mg SUPARTZ 10 mg/mL; 25 mg SUPARTZ 10 mg/mL  Patient tolerance:  Patient tolerated the procedure well with no immediate complications

## 2018-04-17 ENCOUNTER — OFFICE VISIT (OUTPATIENT)
Dept: ORTHOPEDICS | Facility: CLINIC | Age: 58
End: 2018-04-17
Payer: OTHER GOVERNMENT

## 2018-04-17 VITALS — HEIGHT: 69 IN | WEIGHT: 140 LBS | BODY MASS INDEX: 20.73 KG/M2

## 2018-04-17 DIAGNOSIS — M17.0 PRIMARY OSTEOARTHRITIS OF BOTH KNEES: Primary | ICD-10-CM

## 2018-04-17 PROCEDURE — 20610 DRAIN/INJ JOINT/BURSA W/O US: CPT | Mod: 50,,, | Performed by: ORTHOPAEDIC SURGERY

## 2018-04-17 PROCEDURE — 99499 UNLISTED E&M SERVICE: CPT | Mod: ,,, | Performed by: ORTHOPAEDIC SURGERY

## 2018-04-17 NOTE — LETTER
April 17, 2018      Salinas Craig MD  [address]  [address]  [place] [address]           [facility] - Orthopedic Surgery  [address]  [address]  [place] [address]  Phone: [phone]  Fax: [phone]          Patient: Leslie Cotto   MR Number: [id number]   YOB: 1960   Date of Visit: 4/17/2018       Dear Dr. Salinas Craig:    Thank you for referring Leslie Cotto to me for evaluation. Attached you will find relevant portions of my assessment and plan of care.    If you have questions, please do not hesitate to call me. I look forward to following Leslie Cotto along with you.    Sincerely,    Raul Barker Jr., MD    Enclosure  CC:  No Recipients    If you would like to receive this communication electronically, please contact [email or web address] or [phone] to request more information on [employer] Link access.    For providers and/or their staff who would like to refer a patient to [facility], please contact us through our one-stop-shop provider referral line, [facility], [place], at [phone].    If you feel you have received this communication in error or would no longer like to receive these types of communications, please e-mail [email or web address]

## 2018-04-17 NOTE — PROCEDURES
Large Joint Aspiration/Injection  Date/Time: 4/17/2018 4:43 PM  Performed by: NORA KNAPP JR  Authorized by: NORA KNAPP JR     Consent Done?:  Yes (Verbal)  Indications:  Pain  Procedure site marked: Yes    Timeout: Prior to procedure the correct patient, procedure, and site was verified      Location:  Knee  Site:  R knee and L knee  Prep: Patient was prepped and draped in usual sterile fashion    Ultrasonic Guidance for needle placement: No  Needle size:  22 G  Approach:  Lateral  Medications:  25 mg SUPARTZ 10 mg/mL; 25 mg SUPARTZ 10 mg/mL  Patient tolerance:  Patient tolerated the procedure well with no immediate complications

## 2018-04-24 ENCOUNTER — OFFICE VISIT (OUTPATIENT)
Dept: ORTHOPEDICS | Facility: CLINIC | Age: 58
End: 2018-04-24
Payer: OTHER GOVERNMENT

## 2018-04-24 VITALS — WEIGHT: 140 LBS | BODY MASS INDEX: 20.73 KG/M2 | HEIGHT: 69 IN

## 2018-04-24 DIAGNOSIS — M17.0 PRIMARY OSTEOARTHRITIS OF BOTH KNEES: Primary | ICD-10-CM

## 2018-04-24 PROCEDURE — 20610 DRAIN/INJ JOINT/BURSA W/O US: CPT | Mod: 50,,, | Performed by: ORTHOPAEDIC SURGERY

## 2018-04-24 PROCEDURE — 99499 UNLISTED E&M SERVICE: CPT | Mod: ,,, | Performed by: ORTHOPAEDIC SURGERY

## 2018-04-24 NOTE — PROCEDURES
Large Joint Aspiration/Injection  Date/Time: 4/24/2018 5:01 PM  Performed by: NORA KNAPP JR  Authorized by: NORA KNAPP JR     Consent Done?:  Yes (Verbal)  Indications:  Pain  Procedure site marked: Yes    Timeout: Prior to procedure the correct patient, procedure, and site was verified      Location:  Knee  Site:  R knee and L knee (Joints)  Prep: Patient was prepped and draped in usual sterile fashion    Ultrasonic Guidance for needle placement: No  Needle size:  22 G  Approach:  Lateral  Patient tolerance:  Patient tolerated the procedure well with no immediate complications

## 2018-04-24 NOTE — LETTER
April 24, 2018      Salinas Craig MD  [address]  [address]  [place] [address]           [facility] - Orthopedic Surgery  [address]  [address]  [place] [address]  Phone: [phone]  Fax: [phone]          Patient: Leslie Cotto   MR Number: [id number]   YOB: 1960   Date of Visit: 4/24/2018       Dear Dr. Salinas Craig:    Thank you for referring Leslie Cotto to me for evaluation. Attached you will find relevant portions of my assessment and plan of care.    If you have questions, please do not hesitate to call me. I look forward to following Leslie Cotto along with you.    Sincerely,    Raul Barker Jr., MD    Enclosure  CC:  No Recipients    If you would like to receive this communication electronically, please contact [email or web address] or [phone] to request more information on [employer] Link access.    For providers and/or their staff who would like to refer a patient to [facility], please contact us through our one-stop-shop provider referral line, [facility], at [phone].    If you feel you have received this communication in error or would no longer like to receive these types of communications, please e-mail [email or web address]

## 2019-08-26 ENCOUNTER — HOSPITAL ENCOUNTER (OUTPATIENT)
Dept: RADIOLOGY | Facility: CLINIC | Age: 59
Discharge: HOME | End: 2019-08-26
Attending: PODIATRIST
Payer: OTHER GOVERNMENT

## 2019-08-26 ENCOUNTER — OFFICE VISIT (OUTPATIENT)
Dept: PODIATRY | Facility: CLINIC | Age: 59
End: 2019-08-26
Payer: OTHER GOVERNMENT

## 2019-08-26 VITALS
HEIGHT: 69 IN | HEART RATE: 69 BPM | SYSTOLIC BLOOD PRESSURE: 130 MMHG | BODY MASS INDEX: 20.73 KG/M2 | DIASTOLIC BLOOD PRESSURE: 89 MMHG | WEIGHT: 140 LBS

## 2019-08-26 DIAGNOSIS — M77.42 METATARSALGIA OF BOTH FEET: ICD-10-CM

## 2019-08-26 DIAGNOSIS — M21.371 BILATERAL FOOT-DROP: ICD-10-CM

## 2019-08-26 DIAGNOSIS — M19.079 INFLAMMATION OF FOOT JOINT: ICD-10-CM

## 2019-08-26 DIAGNOSIS — M79.671 FOOT PAIN, RIGHT: ICD-10-CM

## 2019-08-26 DIAGNOSIS — M21.372 BILATERAL FOOT-DROP: ICD-10-CM

## 2019-08-26 DIAGNOSIS — M79.672 FOOT PAIN, LEFT: Primary | ICD-10-CM

## 2019-08-26 DIAGNOSIS — M77.41 METATARSALGIA OF BOTH FEET: ICD-10-CM

## 2019-08-26 DIAGNOSIS — M79.672 FOOT PAIN, LEFT: ICD-10-CM

## 2019-08-26 PROCEDURE — 99203 OFFICE O/P NEW LOW 30 MIN: CPT | Mod: S$PBB,25,, | Performed by: PODIATRIST

## 2019-08-26 PROCEDURE — 73630 X-RAY EXAM OF FOOT: CPT | Mod: 50,PBBFAC

## 2019-08-26 PROCEDURE — 73630 XR FOOT COMPLETE 3 VIEW BILATERAL: ICD-10-PCS | Mod: 26,50,S$PBB, | Performed by: PODIATRIST

## 2019-08-26 PROCEDURE — 99999 PR PBB SHADOW E&M-EST. PATIENT-LVL V: ICD-10-PCS | Mod: PBBFAC,,, | Performed by: PODIATRIST

## 2019-08-26 PROCEDURE — 73630 X-RAY EXAM OF FOOT: CPT | Mod: 26,50,S$PBB, | Performed by: PODIATRIST

## 2019-08-26 PROCEDURE — 99203 PR OFFICE/OUTPT VISIT, NEW, LEVL III, 30-44 MIN: ICD-10-PCS | Mod: S$PBB,25,, | Performed by: PODIATRIST

## 2019-08-26 PROCEDURE — 99999 PR PBB SHADOW E&M-EST. PATIENT-LVL V: CPT | Mod: PBBFAC,,, | Performed by: PODIATRIST

## 2019-08-26 PROCEDURE — 99215 OFFICE O/P EST HI 40 MIN: CPT | Mod: PBBFAC | Performed by: PODIATRIST

## 2019-08-26 RX ORDER — DICLOFENAC SODIUM 10 MG/G
2 GEL TOPICAL 4 TIMES DAILY
Qty: 20 G | Refills: 2 | Status: SHIPPED | OUTPATIENT
Start: 2019-08-26

## 2019-08-26 RX ORDER — OMEPRAZOLE 40 MG/1
CAPSULE, DELAYED RELEASE ORAL
COMMUNITY

## 2019-08-26 RX ORDER — MAGNESIUM 250 MG
TABLET ORAL 2 TIMES DAILY
COMMUNITY

## 2019-08-26 NOTE — PROGRESS NOTES
[address]  [place] [address]  Phone: [phone]   [phone]    Patient's PCP:Salinas Craig MD  Referring Provider: No ref. provider found    Subjective:      Chief Complaint:: Foot Pain (Ball of right foot pain)    ANN MARIE Cotto is a 59 y.o. female who presents with a complaint of  Bilateral foot pain lasting for off and on for the last 10 years but constant the last couple of months. Onset of the symptoms was being on feet for 8-9 hours a day.  Current symptoms include pain.  Aggravating factors are walking on them and putting pressure on them. Symptoms have gotten worse.Treatment to date have included taking advil and getting callus pads and adding cushion under them. Patients rates pain 10/10 on pain scale.      Vitals:    08/26/19 1557   BP: 130/89   Pulse:      Shoe Size:     Past Surgical History:   Procedure Laterality Date    excision of bursa  03/17/2016    left knee    HYSTERECTOMY      KNEE ARTHROSCOPY Left 2003     Past Medical History:   Diagnosis Date    Ganglion cyst     Hypertension     Primary osteoarthritis of left knee      Family History   Problem Relation Age of Onset    Heart attack Father         Social History:   Marital Status:   Alcohol History:  reports that she drinks alcohol.  Tobacco History:  reports that she quit smoking about 15 years ago. She has never used smokeless tobacco.  Drug History:  reports that she does not use drugs.    Review of patient's allergies indicates:  No Known Allergies    Current Outpatient Medications   Medication Sig Dispense Refill    beta-carotene,A,-vits C,E/mins (OCUVITE ORAL) Take by mouth.      calcium-vitamin D3 500 mg(1,250mg) -200 unit per tablet Take 1 tablet by mouth 2 (two) times daily with meals.      irbesartan (AVAPRO) 150 MG tablet Take 75 mg by mouth once daily.       magnesium 250 mg Tab Take by mouth 2 (two) times daily.      MV-MINERALS/FA/OMEGA 3,6,9 #3 (WOMEN'S 50+ ADVANCED  ORAL) Take 1 tablet by mouth once daily.      omeprazole (PRILOSEC) 40 MG capsule omeprazole 40 mg capsule,delayed release      polycarbophil (FIBERCON) 625 mg tablet Take 625 mg by mouth once daily.      sertraline (ZOLOFT) 100 MG tablet Take 1 tablet by mouth once daily.      tizanidine (ZANAFLEX) 4 MG tablet Take 1 tablet by mouth 3 (three) times daily.      valacyclovir (VALTREX) 500 MG tablet Take 1 tablet by mouth once daily.      diclofenac sodium (VOLTAREN) 1 % Gel Apply 2 g topically 4 (four) times daily. 20 g 2     No current facility-administered medications for this visit.        Review of Systems      Objective:        Physical Exam:   Foot Exam    General  General Appearance: appears stated age and healthy   Orientation: alert and oriented to person, place, and time   Affect: appropriate   Gait: antalgic       Right Foot/Ankle     Inspection and Palpation  Ecchymosis: none  Tenderness: lesser metatarsophalangeal joints (Plantar 2nd metatarsophalangeal joint)  Swelling: lesser metatarsophalangeal joints (Plantar 2nd metatarsophalangeal joint)  Arch: normal  Skin Exam: skin intact;     Neurovascular  Dorsalis pedis: 2+  Posterior tibial: 2+  Saphenous nerve sensation: normal  Tibial nerve sensation: normal  Superficial peroneal nerve sensation: normal  Deep peroneal nerve sensation: normal  Sural nerve sensation: normal    Muscle Strength  Ankle dorsiflexion: 5  Ankle plantar flexion: 5  Ankle inversion: 5  Ankle eversion: 5  Great toe extension: 5  Great toe flexion: 5    Range of Motion    Normal right ankle ROM      Left Foot/Ankle      Inspection and Palpation  Ecchymosis: none  Tenderness: lesser metatarsophalangeal joints (Plantar 2nd metatarsophalangeal joint)  Swelling: none   Arch: normal  Skin Exam: skin intact;     Neurovascular  Dorsalis pedis: 2+  Posterior tibial: 2+  Saphenous nerve sensation: normal  Tibial nerve sensation: normal  Superficial peroneal nerve sensation: normal  Deep  peroneal nerve sensation: normal  Sural nerve sensation: normal    Muscle Strength  Ankle dorsiflexion: 5  Ankle plantar flexion: 5  Ankle inversion: 5  Ankle eversion: 5  Great toe extension: 5  Great toe flexion: 5    Range of Motion    Normal left ankle ROM          Physical Exam   Cardiovascular:   Pulses:       Dorsalis pedis pulses are 2+ on the right side, and 2+ on the left side.        Posterior tibial pulses are 2+ on the right side, and 2+ on the left side.   Musculoskeletal:        Feet:        Imaging:   AP, lateral, lateral oblique weight-bearing x-rays bilateral feet:  No acute fracture, no bone tumors, no soft tissue masses seen.  Small inferior and posterior calcaneal exostosis present.  Long 2nd metatarsal bilateral. No other abnormality seen.         Assessment:       1. Foot pain, left    2. Metatarsalgia of both feet    3. Inflammation of foot joint    4. Foot pain, right    5. Bilateral foot-drop      Plan:   Foot pain, left  -     X-Ray Foot Complete 3 view Bilateral; Future; Expected date: 08/26/2019  -     diclofenac sodium (VOLTAREN) 1 % Gel; Apply 2 g topically 4 (four) times daily.  Dispense: 20 g; Refill: 2    Metatarsalgia of both feet    Inflammation of foot joint    Foot pain, right  -     Cancel: X-Ray Foot Complete Right  -     X-Ray Foot Complete 3 view Bilateral; Future; Expected date: 08/26/2019  -     diclofenac sodium (VOLTAREN) 1 % Gel; Apply 2 g topically 4 (four) times daily.  Dispense: 20 g; Refill: 2    Bilateral foot-drop     I am recommending no barefoot, accommodative Spenco inserts with felt on the inserts to relieve pressure on 2nd metatarsophalangeal joint bilateral, ice to the area 15 min daily, topical Voltaren Gel daily.  If doing better in 4 weeks is not need to return to see me.  Still having pain return for further evaluation.  No follow-ups on file.    Procedures - None    Counseling:   I explained what joint inflammation is and  conservative treatment of off  loading the joint with OTC inserts and pads vs custom made orthotics.  The use of ice, heat, oral antiinflammatory and topical antiinflammatory and shoe modification as well as rest of the affected area with decrease walking, standing and non-impact exercising.  I provided patient education verbally regarding:   Patient diagnosis, treatment options, as well as alternatives, risks, and benefits.     This note was created using Dragon voice recognition software that occasionally misinterpreted phrases or words.

## 2019-08-27 ENCOUNTER — TELEPHONE (OUTPATIENT)
Dept: PODIATRY | Facility: CLINIC | Age: 59
End: 2019-08-27

## 2019-08-27 NOTE — TELEPHONE ENCOUNTER
Patient called to say that she went to [facility] in [place], [place] this morning 8/27/2019. Pharmacy told her that prescription was not called in. Spoke with pharmacist and they have the prescription and will fill it today. Called patient and informed her that prescription for Voltaren will be ready this afternoon.

## 2023-12-05 ENCOUNTER — OFFICE VISIT (OUTPATIENT)
Dept: HEMATOLOGY/ONCOLOGY | Facility: CLINIC | Age: 63
End: 2023-12-05
Payer: MEDICARE

## 2023-12-05 VITALS
BODY MASS INDEX: 23.48 KG/M2 | RESPIRATION RATE: 16 BRPM | TEMPERATURE: 98 F | WEIGHT: 159 LBS | HEART RATE: 88 BPM | SYSTOLIC BLOOD PRESSURE: 151 MMHG | DIASTOLIC BLOOD PRESSURE: 99 MMHG

## 2023-12-05 DIAGNOSIS — Z82.49 FAMILY HISTORY OF PERIPHERAL VASCULAR DISEASE: ICD-10-CM

## 2023-12-05 DIAGNOSIS — I80.01 THROMBOPHLEBITIS OF SUPERFICIAL VEINS OF RIGHT LOWER EXTREMITY: Primary | ICD-10-CM

## 2023-12-05 PROCEDURE — 99204 OFFICE O/P NEW MOD 45 MIN: CPT | Mod: S$GLB,,, | Performed by: INTERNAL MEDICINE

## 2023-12-05 PROCEDURE — 99204 PR OFFICE/OUTPT VISIT, NEW, LEVL IV, 45-59 MIN: ICD-10-PCS | Mod: S$GLB,,, | Performed by: INTERNAL MEDICINE

## 2023-12-10 NOTE — PROGRESS NOTES
[facility] hematology Oncology in office initial encounter note     December 5, 2023      Subjective:      Patient ID:   Leslie Cotto  63 y.o. female  1960  Gabriel Craig NP      Chief Complaint:  blood clot      HPI:  63 y.o. female had superficial phlebitis with clot involving the right lower leg treated with ibuprofen approximately 3 months ago.  She is referred for hypercoagulation evaluation.      She smokes 1 pack per day for approximately 25 years and quit in 2004.  She drinks wine.  She was in the [employer] for approximately 20 years and worked at [employer] for 20 years.  She does not have history of drug allergies.    EGD was accomplished in 2018, colonoscopy is due at 10 years.  U/S of R leg showed superficial phlebitis of calf, 9/28/23.    Past history includes hypertension, GERD, degenerative joint disease and herpes.    Mammogram is due in May 2024 and vaginal exam is done per Mr. Covingtonny, due in December 2023.    She had cervical cancer in 2004 and had radical hysterectomy per Dr. Mosqueda.  This was followed with radiation therapy.  She did not require combination chemotherapy.  She does have lymphedema at the right leg chronically.  She is status post right and left cataract surgery  Status post right and left knee arthroscopy.    Her mother had diabetes, hypertension, GERD, obesity.  Father had a acute myocardial infarction.  A brother has heart disease and brittle bones.  She does not have children.    She had onset of menses at age 12.  She has not had pregnancy.  She has no children.  She took birth control pills for 10-12 years and did not take hormone replacement therapy.          ROS:   GEN: normal without any fever, night sweats or weight loss  HEENT: normal with no HA's, sore throat, stiff neck, changes in vision  CV: normal with no CP, SOB, PND, STOREY or orthopnea  PULM: normal with no SOB, cough, hemoptysis, sputum or pleuritic pain  GI: normal with no abdominal pain, nausea,  vomiting, constipation, diarrhea, melanotic stools, BRBPR, or hematemesis  : normal with no hematuria, dysuria  BREAST: normal with no mass, discharge, pain  SKIN: normal with no rash, erythema, bruising, or swelling     Past Medical History:   Diagnosis Date    Ganglion cyst     Hypertension     Primary osteoarthritis of left knee      Past Surgical History:   Procedure Laterality Date    excision of bursa  2016    left knee    HYSTERECTOMY      KNEE ARTHROSCOPY Left        Review of patient's allergies indicates:  No Known Allergies  Social History     Socioeconomic History    Marital status:    Tobacco Use    Smoking status: Former     Current packs/day: 0.00     Types: Cigarettes     Quit date:      Years since quittin.9    Smokeless tobacco: Never   Substance and Sexual Activity    Alcohol use: Yes    Drug use: No         Current Outpatient Medications:     beta-carotene,A,-vits C,E/mins (OCUVITE ORAL), Take by mouth., Disp: , Rfl:     calcium-vitamin D3 500 mg(1,250mg) -200 unit per tablet, Take 1 tablet by mouth 2 (two) times daily with meals., Disp: , Rfl:     diclofenac sodium (VOLTAREN) 1 % Gel, Apply 2 g topically 4 (four) times daily., Disp: 20 g, Rfl: 2    irbesartan (AVAPRO) 150 MG tablet, Take 75 mg by mouth once daily. , Disp: , Rfl:     magnesium 250 mg Tab, Take by mouth 2 (two) times daily., Disp: , Rfl:     MV-MINERALS/FA/OMEGA 3,6,9 #3 (WOMEN'S 50+ ADVANCED ORAL), Take 1 tablet by mouth once daily., Disp: , Rfl:     omeprazole (PRILOSEC) 40 MG capsule, omeprazole 40 mg capsule,delayed release, Disp: , Rfl:     polycarbophil (FIBERCON) 625 mg tablet, Take 625 mg by mouth once daily., Disp: , Rfl:     sertraline (ZOLOFT) 100 MG tablet, Take 1 tablet by mouth once daily., Disp: , Rfl:     tizanidine (ZANAFLEX) 4 MG tablet, Take 1 tablet by mouth 3 (three) times daily., Disp: , Rfl:     valacyclovir (VALTREX) 500 MG tablet, Take 1 tablet by mouth once daily., Disp: ,  Rfl:           Objective:   Vitals:  Blood pressure (!) 151/99, pulse 88, temperature 97.6 °F (36.4 °C), resp. rate 16, weight 72.1 kg (159 lb).    Physical Examination:   GEN: no apparent distress, comfortable  HEAD: atraumatic and normocephalic  EYES: no pallor, no icterus  ENT:  no pharyngeal erythema, external ears WNL; no nasal discharge  NECK: no masses, thyroid normal, trachea midline, no LAD/LN's, supple  CV: RRR with no murmur; normal pulse; no pedal edema  CHEST: Normal respiratory effort; CTAB; normal breath sounds; no wheeze or crackles  ABDOM: nontender and nondistended; soft;  no rebound/guarding, L/S NP  MUSC/Skeletal: ROM normal; no crepitus; joints normal  EXTREM: no clubbing, cyanosis, inflammation or swelling  SKIN: no rashes, lesions, ulcers, petechiae   : no cvat  NEURO: grossly intact; motor/sensory WNL; no tremors  PSYCH: normal mood, affect and behavior  LYMPH: normal cervical, supraclavicular, axillary LN's      Labs:   Lab Results   Component Value Date    WBC 6.6 08/30/2017    HGB 14.9 08/30/2017    HCT 44.9 08/30/2017    MCV 93.9 08/30/2017     08/30/2017    CMP  Sodium   Date Value Ref Range Status   08/30/2017 138 134 - 144 mmol/L      Potassium   Date Value Ref Range Status   08/30/2017 4.3 3.5 - 5.0 mmol/L      Chloride   Date Value Ref Range Status   08/30/2017 101 98 - 110 mmol/L      CO2   Date Value Ref Range Status   08/30/2017 29.1 22.8 - 31.6 mmol/L      Glucose   Date Value Ref Range Status   08/30/2017 103 (H) 70 - 99 mg/dL      BUN   Date Value Ref Range Status   08/30/2017 10 8 - 20 mg/dL      Creatinine   Date Value Ref Range Status   08/30/2017 0.66 0.60 - 1.40 mg/dL      Calcium   Date Value Ref Range Status   08/30/2017 9.8 7.7 - 10.4 mg/dL      Total Protein   Date Value Ref Range Status   08/30/2017 7.1 6.0 - 8.2 g/dL      Albumin   Date Value Ref Range Status   08/30/2017 4.3 3.1 - 4.7 g/dL      Total Bilirubin   Date Value Ref Range Status   08/30/2017 0.9  0.3 - 1.0 mg/dL      Alkaline Phosphatase   Date Value Ref Range Status   08/30/2017 59 40 - 104 IU/L      AST   Date Value Ref Range Status   08/30/2017 31 10 - 40 IU/L          Assessment:   (1) 63 y.o. female with R leg superficial phlebitis, referred for hypercoagulation evaluation.    (2)Hx of cervical cancer, S/P radical hysterectomy and Rad Rx.    (3)Lab evaluation ordered  [facility] for 12/8/23.    RTC 3-4 weeks to review the results.          1. Thrombophlebitis of superficial veins of right lower extremity    2. Family history of peripheral vascular disease        Plan:       Thrombophlebitis of superficial veins of right lower extremity  -     Methylenetetrahydrofol Genotyping (C677T/K8901J); Future; Expected date: 12/08/2023  -     Beta-2 Glycoprotein Abs (IgA, IgG, IgM); Future; Expected date: 12/08/2023  -     Factor 8 Assay; Future; Expected date: 12/08/2023  -     Factor 12 Assay; Future; Expected date: 12/08/2023  -     Fibrinogen; Future; Expected date: 12/08/2023  -     Lupus Anticoagulant Eval w/Reflex; Future; Expected date: 12/08/2023  -     Anticardiolipin Ab, IgG/M, Qn; Future; Expected date: 12/08/2023  -     Factor 5 leiden; Future; Expected date: 12/08/2023  -     APC Resistance; Future; Expected date: 12/08/2023  -     Homocysteine, Serum; Future; Expected date: 12/08/2023  -     Prothrombin [id number] Mutation; Future; Expected date: 12/08/2023  -     Protein C & S Activity w/ Reflex; Future; Expected date: 12/08/2023  -     Antithrombin III activity w/rfx to Antithrombin III Antigen; Future; Expected date: 12/08/2023  -     Protime-INR; Future; Expected date: 12/08/2023  -     APTT; Future; Expected date: 12/08/2023  -     CBC Auto Differential; Future; Expected date: 12/08/2023    Family history of peripheral vascular disease  -     Methylenetetrahydrofol Genotyping (C677T/N8291M); Future; Expected date: 12/08/2023  -     Beta-2 Glycoprotein Abs (IgA, IgG, IgM); Future; Expected  date: 12/08/2023  -     Factor 8 Assay; Future; Expected date: 12/08/2023  -     Factor 12 Assay; Future; Expected date: 12/08/2023  -     Fibrinogen; Future; Expected date: 12/08/2023  -     Lupus Anticoagulant Eval w/Reflex; Future; Expected date: 12/08/2023  -     Anticardiolipin Ab, IgG/M, Qn; Future; Expected date: 12/08/2023  -     Factor 5 leiden; Future; Expected date: 12/08/2023  -     APC Resistance; Future; Expected date: 12/08/2023  -     Homocysteine, Serum; Future; Expected date: 12/08/2023  -     Prothrombin [id number] Mutation; Future; Expected date: 12/08/2023  -     Protein C & S Activity w/ Reflex; Future; Expected date: 12/08/2023  -     Antithrombin III activity w/rfx to Antithrombin III Antigen; Future; Expected date: 12/08/2023  -     Protime-INR; Future; Expected date: 12/08/2023  -     APTT; Future; Expected date: 12/08/2023  -     CBC Auto Differential; Future; Expected date: 12/08/2023      Follow up in about 1 month (around 1/5/2024) for check of blood status after therapy, check blood clot status after therapy..    I have explained and the patient understands all of  the current recommendation(s). I have answered all of their questions to the best of my ability and to their complete satisfaction.

## 2024-01-10 ENCOUNTER — OFFICE VISIT (OUTPATIENT)
Dept: HEMATOLOGY/ONCOLOGY | Facility: CLINIC | Age: 64
End: 2024-01-10
Payer: MEDICARE

## 2024-01-10 VITALS
RESPIRATION RATE: 16 BRPM | HEIGHT: 69 IN | BODY MASS INDEX: 25.53 KG/M2 | DIASTOLIC BLOOD PRESSURE: 81 MMHG | HEART RATE: 98 BPM | WEIGHT: 172.38 LBS | SYSTOLIC BLOOD PRESSURE: 117 MMHG | TEMPERATURE: 97 F

## 2024-01-10 DIAGNOSIS — Z86.79 H/O SUPERFICIAL PHLEBITIS: Primary | ICD-10-CM

## 2024-01-10 DIAGNOSIS — Z15.89 MTHFR GENE MUTATION: ICD-10-CM

## 2024-01-10 DIAGNOSIS — D68.52 PROTHROMBIN G20210A MUTATION: ICD-10-CM

## 2024-01-10 PROCEDURE — 99214 OFFICE O/P EST MOD 30 MIN: CPT | Mod: S$GLB,,, | Performed by: INTERNAL MEDICINE

## 2024-01-10 NOTE — PROGRESS NOTES
[facility] hematology Oncology in office Subsequent encounter note     January 10, 2024      Subjective:      Patient ID:   Leslie Cotto  63 y.o. female  1960  Gabriel Craig NP      Chief Complaint:  blood clot      HPI:  63 y.o. female had superficial phlebitis with clot involving the right lower leg treated with ibuprofen approximately 3 months ago.  She is referred for hypercoagulation evaluation.      She smokes 1 pack per day for approximately 25 years and quit in 2004.  She drinks wine.  She was in the [employer] for approximately 20 years and worked at [employer] for 20 years.  She does not have history of drug allergies.    EGD was accomplished in 2018, colonoscopy is due at 10 years.  U/S of R leg showed superficial phlebitis of calf, 9/28/23.    Past history includes hypertension, GERD, degenerative joint disease and herpes.    Mammogram is due in May 2024 and vaginal exam is done per Mr. Still, due in December 2023.    She had cervical cancer in 2004 and had radical hysterectomy per Dr. Mosqueda.  This was followed with radiation therapy.  She did not require combination chemotherapy.  She does have lymphedema at the right leg chronically.  She is status post right and left cataract surgery  Status post right and left knee arthroscopy.    Her mother had diabetes, hypertension, GERD, obesity.  Father had a acute myocardial infarction.  A brother has heart disease and brittle bones.  She does not have children.    She had onset of menses at age 12.  She has not had pregnancy.  She has no children.  She took birth control pills for 10-12 years and did not take hormone replacement therapy.    Hypercoagulation evaluation shows that she is positive for Prothrombin gene mutation. And a carrier for 1 of the MTHFR mutations.  She has an estimated 2 fold to 4 fold increase in clot events as compared to individuals with out these results.  She will continue on ASA prophylaxis daily.  If in the future she  develops DVT or PE, then she would go on Eliquis or Xarelto.    Family members may also have these mutations and can be screened by ordering Prothrombin gene mutation and MTHFR mutation (both types).      ROS:   GEN: normal without any fever, night sweats or weight loss  HEENT: normal with no HA's, sore throat, stiff neck, changes in vision  CV: normal with no CP, SOB, PND, STOREY or orthopnea  PULM: normal with no SOB, cough, hemoptysis, sputum or pleuritic pain  GI: normal with no abdominal pain, nausea, vomiting, constipation, diarrhea, melanotic stools, BRBPR, or hematemesis  : normal with no hematuria, dysuria  BREAST: normal with no mass, discharge, pain  SKIN: normal with no rash, erythema, bruising, or swelling     Past Medical History:   Diagnosis Date    Ganglion cyst     Hypertension     Primary osteoarthritis of left knee      Past Surgical History:   Procedure Laterality Date    excision of bursa  2016    left knee    HYSTERECTOMY      KNEE ARTHROSCOPY Left        Review of patient's allergies indicates:  No Known Allergies  Social History     Socioeconomic History    Marital status:    Tobacco Use    Smoking status: Former     Current packs/day: 0.00     Types: Cigarettes     Quit date:      Years since quittin.0    Smokeless tobacco: Never   Substance and Sexual Activity    Alcohol use: Yes    Drug use: No         Current Outpatient Medications:     beta-carotene,A,-vits C,E/mins (OCUVITE ORAL), Take by mouth., Disp: , Rfl:     diclofenac sodium (VOLTAREN) 1 % Gel, Apply 2 g topically 4 (four) times daily., Disp: 20 g, Rfl: 2    irbesartan (AVAPRO) 150 MG tablet, Take 75 mg by mouth once daily. , Disp: , Rfl:     magnesium 250 mg Tab, Take by mouth 2 (two) times daily., Disp: , Rfl:     MV-MINERALS/FA/OMEGA 3,6,9 #3 (WOMEN'S 50+ ADVANCED ORAL), Take 1 tablet by mouth once daily., Disp: , Rfl:     omeprazole (PRILOSEC) 40 MG capsule, omeprazole 40 mg capsule,delayed release,  "Disp: , Rfl:     polycarbophil (FIBERCON) 625 mg tablet, Take 625 mg by mouth once daily., Disp: , Rfl:     sertraline (ZOLOFT) 100 MG tablet, Take 1 tablet by mouth once daily., Disp: , Rfl:     tizanidine (ZANAFLEX) 4 MG tablet, Take 1 tablet by mouth 3 (three) times daily., Disp: , Rfl:     valacyclovir (VALTREX) 500 MG tablet, Take 1 tablet by mouth once daily., Disp: , Rfl:     calcium-vitamin D3 500 mg(1,250mg) -200 unit per tablet, Take 1 tablet by mouth 2 (two) times daily with meals., Disp: , Rfl:           Objective:   Vitals:  Blood pressure 117/81, pulse 98, temperature 97.2 °F (36.2 °C), resp. rate 16, height 5' 9" (1.753 m), weight 78.2 kg (172 lb 6.4 oz).    Physical Examination:   GEN: no apparent distress, comfortable  HEAD: atraumatic and normocephalic  EYES: no pallor, no icterus  ENT:  no pharyngeal erythema, external ears WNL; no nasal discharge  NECK: no masses, thyroid normal, trachea midline, no LAD/LN's, supple  CV: RRR with no murmur; normal pulse; no pedal edema  CHEST: Normal respiratory effort; CTAB; normal breath sounds; no wheeze or crackles  ABDOM: nontender and nondistended; soft;  no rebound/guarding, L/S NP  MUSC/Skeletal: ROM normal; no crepitus; joints normal  EXTREM: no clubbing, cyanosis, inflammation or swelling  SKIN: no rashes, lesions, ulcers, petechiae   : no cvat  NEURO: grossly intact; motor/sensory WNL; no tremors  PSYCH: normal mood, affect and behavior  LYMPH: normal cervical, supraclavicular, axillary LN's      Labs:   Lab Results   Component Value Date    WBC 6.6 08/30/2017    HGB 14.9 08/30/2017    HCT 44.9 08/30/2017    MCV 93.9 08/30/2017     08/30/2017    CMP  Sodium   Date Value Ref Range Status   08/30/2017 138 134 - 144 mmol/L      Potassium   Date Value Ref Range Status   08/30/2017 4.3 3.5 - 5.0 mmol/L      Chloride   Date Value Ref Range Status   08/30/2017 101 98 - 110 mmol/L      CO2   Date Value Ref Range Status   08/30/2017 29.1 22.8 - 31.6 " mmol/L      Glucose   Date Value Ref Range Status   08/30/2017 103 (H) 70 - 99 mg/dL      BUN   Date Value Ref Range Status   08/30/2017 10 8 - 20 mg/dL      Creatinine   Date Value Ref Range Status   08/30/2017 0.66 0.60 - 1.40 mg/dL      Calcium   Date Value Ref Range Status   08/30/2017 9.8 7.7 - 10.4 mg/dL      Total Protein   Date Value Ref Range Status   08/30/2017 7.1 6.0 - 8.2 g/dL      Albumin   Date Value Ref Range Status   08/30/2017 4.3 3.1 - 4.7 g/dL      Total Bilirubin   Date Value Ref Range Status   08/30/2017 0.9 0.3 - 1.0 mg/dL      Alkaline Phosphatase   Date Value Ref Range Status   08/30/2017 59 40 - 104 IU/L      AST   Date Value Ref Range Status   08/30/2017 31 10 - 40 IU/L          Assessment:   (1) 63 y.o. female with R leg superficial phlebitis, referred for hypercoagulation evaluation.    (2)Hx of cervical cancer, S/P radical hysterectomy and Rad Rx.    (3)She has prothrombin gene mutation and carrier state for MTHFR mutation.  She has a 2 fold to 4 fold increased risk of DVT and PE, as compared to an individual with out these mutations.  If in the future she developes DVT or PE, she will go on Eliquis or Xarelto.  She will continue on her ASA prophylaxis daily now.    (4)Family members may also have these mutations, and can be screened by ordering Prothrombin gene mutation and MTHFR mutation (both types).    This completes my evaluation, please refer her back if and when you feel indicated.  Thank you for letting me be involved in the care of this pleasant individual.    Edson Green MD  [facility] Heme/Onc  1/10/24

## 2024-01-15 ENCOUNTER — TELEPHONE (OUTPATIENT)
Dept: HEMATOLOGY/ONCOLOGY | Facility: CLINIC | Age: 64
End: 2024-01-15

## 2024-01-17 ENCOUNTER — TELEPHONE (OUTPATIENT)
Dept: HEMATOLOGY/ONCOLOGY | Facility: CLINIC | Age: 64
End: 2024-01-17

## 2024-01-17 NOTE — TELEPHONE ENCOUNTER
Mailed a copy of patients clinic note from 1/10/24 per Dr Tiwari's request. Called and left vmail for patient.

## 2024-02-24 ENCOUNTER — OFFICE VISIT (OUTPATIENT)
Dept: URGENT CARE | Facility: CLINIC | Age: 64
End: 2024-02-24
Payer: MEDICARE

## 2024-02-24 VITALS
BODY MASS INDEX: 25.48 KG/M2 | DIASTOLIC BLOOD PRESSURE: 98 MMHG | HEART RATE: 90 BPM | OXYGEN SATURATION: 95 % | RESPIRATION RATE: 16 BRPM | WEIGHT: 172 LBS | TEMPERATURE: 99 F | HEIGHT: 69 IN | SYSTOLIC BLOOD PRESSURE: 169 MMHG

## 2024-02-24 DIAGNOSIS — J06.9 VIRAL URI: ICD-10-CM

## 2024-02-24 DIAGNOSIS — R06.2 WHEEZING: ICD-10-CM

## 2024-02-24 DIAGNOSIS — J02.9 SORE THROAT: ICD-10-CM

## 2024-02-24 DIAGNOSIS — Z20.822 COVID-19 VIRUS NOT DETECTED: ICD-10-CM

## 2024-02-24 DIAGNOSIS — J18.9 PNEUMONIA OF RIGHT LOWER LOBE DUE TO INFECTIOUS ORGANISM: Primary | ICD-10-CM

## 2024-02-24 LAB
CTP QC/QA: YES
FLUAV AG NPH QL: NEGATIVE
FLUBV AG NPH QL: NEGATIVE
S PYO RRNA THROAT QL PROBE: NEGATIVE
SARS-COV-2 AG RESP QL IA.RAPID: NEGATIVE

## 2024-02-24 PROCEDURE — 96372 THER/PROPH/DIAG INJ SC/IM: CPT | Mod: 59,S$GLB,, | Performed by: NURSE PRACTITIONER

## 2024-02-24 PROCEDURE — 87804 INFLUENZA ASSAY W/OPTIC: CPT | Mod: 59,QW,, | Performed by: NURSE PRACTITIONER

## 2024-02-24 PROCEDURE — 99204 OFFICE O/P NEW MOD 45 MIN: CPT | Mod: 25,S$GLB,, | Performed by: NURSE PRACTITIONER

## 2024-02-24 PROCEDURE — 94640 AIRWAY INHALATION TREATMENT: CPT | Mod: S$GLB,,, | Performed by: NURSE PRACTITIONER

## 2024-02-24 PROCEDURE — 87880 STREP A ASSAY W/OPTIC: CPT | Mod: QW,,, | Performed by: NURSE PRACTITIONER

## 2024-02-24 PROCEDURE — 87811 SARS-COV-2 COVID19 W/OPTIC: CPT | Mod: QW,S$GLB,, | Performed by: NURSE PRACTITIONER

## 2024-02-24 RX ORDER — AMOXICILLIN AND CLAVULANATE POTASSIUM 875; 125 MG/1; MG/1
1 TABLET, FILM COATED ORAL EVERY 12 HOURS
Qty: 14 TABLET | Refills: 0 | Status: SHIPPED | OUTPATIENT
Start: 2024-02-24 | End: 2024-03-02

## 2024-02-24 RX ORDER — ALBUTEROL SULFATE 0.83 MG/ML
2.5 SOLUTION RESPIRATORY (INHALATION)
Status: COMPLETED | OUTPATIENT
Start: 2024-02-24 | End: 2024-02-24

## 2024-02-24 RX ORDER — DOXYCYCLINE 100 MG/1
100 CAPSULE ORAL EVERY 12 HOURS
Qty: 14 CAPSULE | Refills: 0 | Status: SHIPPED | OUTPATIENT
Start: 2024-02-24 | End: 2024-03-02

## 2024-02-24 RX ORDER — AZELASTINE 1 MG/ML
1 SPRAY, METERED NASAL 2 TIMES DAILY PRN
Qty: 30 ML | Refills: 0 | Status: SHIPPED | OUTPATIENT
Start: 2024-02-24 | End: 2025-02-23

## 2024-02-24 RX ORDER — GUAIFENESIN 200 MG/1
400 TABLET ORAL EVERY 4 HOURS PRN
Qty: 30 TABLET | Refills: 0 | Status: SHIPPED | OUTPATIENT
Start: 2024-02-24 | End: 2024-03-02

## 2024-02-24 RX ORDER — PROMETHAZINE HYDROCHLORIDE AND DEXTROMETHORPHAN HYDROBROMIDE 6.25; 15 MG/5ML; MG/5ML
5 SYRUP ORAL NIGHTLY PRN
Qty: 50 ML | Refills: 0 | Status: SHIPPED | OUTPATIENT
Start: 2024-02-24 | End: 2024-03-02

## 2024-02-24 RX ORDER — ALBUTEROL SULFATE 90 UG/1
2 AEROSOL, METERED RESPIRATORY (INHALATION) EVERY 6 HOURS PRN
Qty: 18 G | Refills: 0 | Status: SHIPPED | OUTPATIENT
Start: 2024-02-24 | End: 2025-02-23

## 2024-02-24 RX ORDER — IPRATROPIUM BROMIDE 0.5 MG/2.5ML
0.5 SOLUTION RESPIRATORY (INHALATION)
Status: COMPLETED | OUTPATIENT
Start: 2024-02-24 | End: 2024-02-24

## 2024-02-24 RX ORDER — BENZONATATE 100 MG/1
100 CAPSULE ORAL 3 TIMES DAILY PRN
Qty: 21 CAPSULE | Refills: 0 | Status: SHIPPED | OUTPATIENT
Start: 2024-02-24 | End: 2024-03-02

## 2024-02-24 RX ORDER — DEXAMETHASONE SODIUM PHOSPHATE 100 MG/10ML
10 INJECTION INTRAMUSCULAR; INTRAVENOUS
Status: COMPLETED | OUTPATIENT
Start: 2024-02-24 | End: 2024-02-24

## 2024-02-24 RX ADMIN — DEXAMETHASONE SODIUM PHOSPHATE 10 MG: 100 INJECTION INTRAMUSCULAR; INTRAVENOUS at 03:02

## 2024-02-24 RX ADMIN — ALBUTEROL SULFATE 2.5 MG: 0.83 SOLUTION RESPIRATORY (INHALATION) at 03:02

## 2024-02-24 RX ADMIN — IPRATROPIUM BROMIDE 0.5 MG: 0.5 SOLUTION RESPIRATORY (INHALATION) at 03:02

## 2024-02-24 NOTE — PROGRESS NOTES
"Subjective:      Patient ID: Leslie Cotto is a 63 y.o. female.    Vitals:  height is 5' 9" (1.753 m) and weight is 78 kg (172 lb). Her oral temperature is 99.3 °F (37.4 °C). Her blood pressure is 169/98 (abnormal) and her pulse is 90. Her respiration is 16 and oxygen saturation is 95%.     Chief Complaint: Cough    63-year-old female seen for upper respiratory symptoms and cough.  She states symptoms began approximately 2 weeks ago but the cough has progressively worsened.  She is now wheezing and having frequent productive coughing.  She also states that she has been having a persistent fever ranging between 100 and 101° oral.    Cough  This is a new problem. The current episode started 1 to 4 weeks ago (14 days). The problem has been gradually worsening. The problem occurs every few minutes. The cough is Productive of sputum. Associated symptoms include chills, a fever, headaches, postnasal drip, a sore throat and wheezing. Pertinent negatives include no chest pain, ear pain, hemoptysis, myalgias, rash or shortness of breath. The symptoms are aggravated by lying down. She has tried OTC cough suppressant for the symptoms. The treatment provided no relief.       Constitution: Positive for chills and fever.   HENT:  Positive for congestion, postnasal drip and sore throat. Negative for ear pain.    Neck: Negative for painful lymph nodes.   Cardiovascular:  Negative for chest pain, palpitations and sob on exertion.   Respiratory:  Positive for cough, sputum production and wheezing. Negative for bloody sputum and shortness of breath.    Gastrointestinal:  Negative for nausea and vomiting.   Musculoskeletal:  Negative for muscle ache.   Skin:  Negative for rash.   Neurological:  Positive for headaches. Negative for dizziness, light-headedness, passing out, disorientation and altered mental status.   Hematologic/Lymphatic: Negative for swollen lymph nodes.   Psychiatric/Behavioral:  Negative for altered mental status, " disorientation and confusion.       Objective:     Physical Exam   Constitutional: She is oriented to person, place, and time. She appears well-developed. She is cooperative.  Non-toxic appearance. She does not appear ill. No distress.   HENT:   Head: Normocephalic and atraumatic.   Ears:   Right Ear: Hearing and external ear normal.   Left Ear: Hearing and external ear normal.   Nose: Rhinorrhea and congestion present. No mucosal edema or nasal deformity. No epistaxis. Right sinus exhibits no maxillary sinus tenderness and no frontal sinus tenderness. Left sinus exhibits no maxillary sinus tenderness and no frontal sinus tenderness.   Mouth/Throat: Uvula is midline, oropharynx is clear and moist and mucous membranes are normal. Mucous membranes are moist. No trismus in the jaw. Normal dentition. No uvula swelling. No oropharyngeal exudate, posterior oropharyngeal edema or posterior oropharyngeal erythema.   Eyes: Conjunctivae and lids are normal. No scleral icterus.   Neck: Trachea normal and phonation normal. Neck supple. No edema present. No erythema present. No neck rigidity present.   Cardiovascular: Normal rate, regular rhythm, normal heart sounds and normal pulses.   Pulmonary/Chest: Effort normal. No accessory muscle usage or stridor. No respiratory distress. She has no decreased breath sounds. She has wheezes in the right upper field. She has rhonchi in the right upper field.   Abdominal: Normal appearance.   Musculoskeletal: Normal range of motion.         General: No deformity. Normal range of motion.   Lymphadenopathy:     She has no cervical adenopathy.   Neurological: no focal deficit. She is alert and oriented to person, place, and time. She exhibits normal muscle tone. Coordination normal.   Skin: Skin is warm, dry, intact, not diaphoretic and not pale. Capillary refill takes 2 to 3 seconds.   Psychiatric: Her speech is normal and behavior is normal. Judgment and thought content normal.   Nursing  note and vitals reviewed.      Assessment:     1. Pneumonia of right lower lobe due to infectious organism    2. Sore throat    3. Viral URI    4. COVID-19 virus not detected    5. Wheezing        Plan:       Pneumonia of right lower lobe due to infectious organism  -     XR CHEST PA AND LATERAL; Future; Expected date: 02/24/2024  -     guaiFENesin 200 mg tablet; Take 2 tablets (400 mg total) by mouth every 4 (four) hours as needed for Congestion.  Dispense: 30 tablet; Refill: 0  -     promethazine-dextromethorphan (PROMETHAZINE-DM) 6.25-15 mg/5 mL Syrp; Take 5 mLs by mouth nightly as needed (cough).  Dispense: 50 mL; Refill: 0  -     benzonatate (TESSALON) 100 MG capsule; Take 1 capsule (100 mg total) by mouth 3 (three) times daily as needed for Cough.  Dispense: 21 capsule; Refill: 0  -     albuterol (PROVENTIL HFA) 90 mcg/actuation inhaler; Inhale 2 puffs into the lungs every 6 (six) hours as needed for Wheezing. Rescue  Dispense: 18 g; Refill: 0  -     albuterol nebulizer solution 2.5 mg  -     ipratropium 0.02 % nebulizer solution 0.5 mg  -     dexAMETHasone injection 10 mg  -     amoxicillin-clavulanate 875-125mg (AUGMENTIN) 875-125 mg per tablet; Take 1 tablet by mouth every 12 (twelve) hours. for 7 days  Dispense: 14 tablet; Refill: 0  -     doxycycline (VIBRAMYCIN) 100 MG Cap; Take 1 capsule (100 mg total) by mouth every 12 (twelve) hours. for 7 days  Dispense: 14 capsule; Refill: 0    Sore throat  -     SARS Coronavirus 2 Antigen, POCT Manual Read  -     POCT Influenza A/B Rapid Antigen  -     POCT rapid strep A  -     benzocaine-menthoL 6-10 mg lozenge; Take 1 lozenge by mouth every 2 (two) hours as needed (Sore Throat).  Dispense: 18 tablet; Refill: 0    Viral URI  -     azelastine (ASTELIN) 137 mcg (0.1 %) nasal spray; 1 spray (137 mcg total) by Nasal route 2 (two) times daily as needed for Rhinitis.  Dispense: 30 mL; Refill: 0    COVID-19 virus not detected    Wheezing  -     albuterol (PROVENTIL HFA)  90 mcg/actuation inhaler; Inhale 2 puffs into the lungs every 6 (six) hours as needed for Wheezing. Rescue  Dispense: 18 g; Refill: 0  -     albuterol nebulizer solution 2.5 mg  -     ipratropium 0.02 % nebulizer solution 0.5 mg  -     dexAMETHasone injection 10 mg    Strep negative  Flu negative    Xray- Impression:     Findings suspicious for developing pneumonia in the right lung base        The test and x-ray results and physical exam findings were discussed with the patient and all questions answered.  Patient coughing resolved and she reports feeling better after nebulizer treatment.  Patient does report feeling jittery.  Bilateral breath sounds clear to auscultation after treatment.  We discussed symptom monitoring, conservative care methods, medication use, and follow up orders.  She verbalized understanding and agreement with the plan of care.

## 2024-02-24 NOTE — PATIENT INSTRUCTIONS
Increase clear fluid intake  Start Augmentin and doxycycline and take as prescribed.  Take entire course once started  You may use albuterol inhaler as needed for wheezing  Stop all current over the counter cough, cold, flu medicine  Tylenol/motrin otc for fever or pain  Use Astelin nasal spray as needed for sinus congestion and pressure.  Take Mucinex   as needed for chest congestion and coughing. Take mucinex with a full glass of water at each dose  Tessalon Perles as needed for excessive daytime coughing  May use promethazine DM at bedtime for excessive nighttime coughing  Add a humidifier to your room at bedtime for respiratory comfort.  Saltwater gargles 4 x daily and benzocaine anesthetic throat lozenges for sore throat. May also add honey based cough syrup  Follow up with PCP  Go immediately to the nearest emergency room for shortness of breath, chest pain,  or other emergent concern.  Return to clinic for new, worse, or unresolving symptoms

## 2024-12-09 NOTE — LETTER
April 10, 2018      Salinas Craig MD  [address]  [address]  [place] [address]           [facility] Orthopedic Surgery  [address]  [address]  [place] [address]  Phone: [phone]  Fax: [phone]          Patient: Leslie Cotto   MR Number: [id number]   YOB: 1960   Date of Visit: 4/10/2018       Dear Dr. Salinas Craig:    Thank you for referring Leslie Cotto to me for evaluation. Attached you will find relevant portions of my assessment and plan of care.    If you have questions, please do not hesitate to call me. I look forward to following Leslie Cotto along with you.    Sincerely,    Raul Barker Jr., MD    Enclosure  CC:  No Recipients    If you would like to receive this communication electronically, please contact [email or web address] or [phone] to request more information on [facility] access.    For providers and/or their staff who would like to refer a patient to [facility], please contact us through our one-stop-shop provider referral line, [facility], at [phone].    If you feel you have received this communication in error or would no longer like to receive these types of communications, please e-mail [email or web address]         
Yes

## 2025-04-08 ENCOUNTER — OFFICE VISIT (OUTPATIENT)
Dept: PODIATRY | Facility: CLINIC | Age: 65
End: 2025-04-08
Payer: MEDICARE

## 2025-04-08 VITALS
RESPIRATION RATE: 18 BRPM | HEART RATE: 64 BPM | DIASTOLIC BLOOD PRESSURE: 85 MMHG | SYSTOLIC BLOOD PRESSURE: 126 MMHG | WEIGHT: 176.38 LBS | BODY MASS INDEX: 26.12 KG/M2 | HEIGHT: 69 IN

## 2025-04-08 DIAGNOSIS — R60.0 PEDAL EDEMA: ICD-10-CM

## 2025-04-08 DIAGNOSIS — G57.61 NEUROMA OF SECOND INTERSPACE OF RIGHT FOOT: Primary | ICD-10-CM

## 2025-04-08 DIAGNOSIS — G57.91 NEURITIS OF RIGHT FOOT: ICD-10-CM

## 2025-04-08 DIAGNOSIS — M79.671 PAIN IN RIGHT FOOT: ICD-10-CM

## 2025-04-08 PROCEDURE — 99203 OFFICE O/P NEW LOW 30 MIN: CPT | Mod: S$PBB,,, | Performed by: PODIATRIST

## 2025-04-08 PROCEDURE — 99215 OFFICE O/P EST HI 40 MIN: CPT | Mod: PBBFAC | Performed by: PODIATRIST

## 2025-04-08 PROCEDURE — 99999 PR PBB SHADOW E&M-EST. PATIENT-LVL V: CPT | Mod: PBBFAC,,, | Performed by: PODIATRIST

## 2025-04-08 RX ORDER — ASPIRIN 81 MG/1
1 TABLET ORAL DAILY
COMMUNITY

## 2025-04-08 RX ORDER — CYCLOBENZAPRINE HCL 5 MG
5 TABLET ORAL NIGHTLY PRN
COMMUNITY

## 2025-04-08 RX ORDER — VARENICLINE 0.03 MG/.05ML
SPRAY NASAL
COMMUNITY

## 2025-04-08 RX ORDER — IBUPROFEN 600 MG/1
1 TABLET ORAL EVERY 8 HOURS PRN
COMMUNITY

## 2025-04-08 RX ORDER — FLUTICASONE PROPIONATE 50 MCG
1 SPRAY, SUSPENSION (ML) NASAL
COMMUNITY

## 2025-04-08 RX ORDER — DOXYCYCLINE 100 MG/1
CAPSULE ORAL
COMMUNITY
End: 2025-04-10

## 2025-04-08 RX ORDER — CYCLOSPORINE 0 G/ML
SOLUTION/ DROPS OPHTHALMIC; TOPICAL
COMMUNITY

## 2025-04-10 NOTE — PROGRESS NOTES
"Subjective:       Patient ID: Leslie Cotto is a 64 y.o. female.    Chief Complaint: Foot Pain (Right Foot) and Foot Swelling (Right Foot)  Patient presents with complaint foot pain and swelling for about 2 weeks.  Swelling has progressed.  Started after working in the yard with old shoes, pain is in the midfoot.  Denies injury.    Has had plantar fasciitis in the past, did stretching and other treatments which help this condition but has not helped the right foot.  Aggravated by certain shoes, prolonged walking, pain level 3/10    Past Medical History:   Diagnosis Date    Ganglion cyst     Hypertension     Primary osteoarthritis of left knee      Past Surgical History:   Procedure Laterality Date    excision of bursa  2016    left knee    HYSTERECTOMY      KNEE ARTHROSCOPY Left      Family History   Problem Relation Name Age of Onset    Heart attack Father       Social History     Socioeconomic History    Marital status:    Tobacco Use    Smoking status: Former     Current packs/day: 0.00     Types: Cigarettes     Quit date:      Years since quittin.2    Smokeless tobacco: Never   Substance and Sexual Activity    Alcohol use: Yes    Drug use: No       Current Medications[1]  Review of patient's allergies indicates:  No Known Allergies    Review of Systems   Musculoskeletal:  Positive for arthralgias. Negative for gait problem.   All other systems reviewed and are negative.      Objective:      Vitals:    25 0938   BP: 126/85   Pulse: 64   Resp: 18   Weight: 80 kg (176 lb 6.4 oz)   Height: 5' 9" (1.753 m)     Physical Exam  Vitals and nursing note reviewed.   Constitutional:       General: She is not in acute distress.     Appearance: Normal appearance.   Cardiovascular:      Pulses:           Dorsalis pedis pulses are 2+ on the right side and 2+ on the left side.        Posterior tibial pulses are 2+ on the right side and 2+ on the left side.   Pulmonary:      Effort: Pulmonary " effort is normal.   Musculoskeletal:         General: Swelling and tenderness present.      Right foot: No deformity.      Left foot: No deformity.      Comments: Moderate pedal edema right foot with mild erythema dorsally due to pain upon compression 2nd common plantar digital nerve consistent with neuroma, radiating to the dorsal foot, consistent with neuritis.  Mild ankle edema right   Feet:      Right foot:      Skin integrity: Erythema present.      Left foot:      Skin integrity: Skin integrity normal.   Skin:     Capillary Refill: Capillary refill takes less than 2 seconds.   Neurological:      General: No focal deficit present.      Mental Status: She is alert.   Psychiatric:         Thought Content: Thought content normal.                        Assessment:       1. Neuroma of second interspace of right foot    2. Pedal edema    3. Neuritis of right foot    4. Pain in right foot        Plan:             Demonstrated for patient neuroma pain upon compression 2nd webspace right foot.  We discussed radiating neuritis, affecting the nerve to the midfoot, swelling and some mild redness on the top of the foot  Advised patient inflammation needs to be treated aggressively  We discussed a combination of oral and topical anti-inflammatory for best results  Discussed over-the-counter anti-inflammatory twice daily with meals x1 week, then can decrease 2nd week  We reviewed ice/cool therapy in frequency this should be performed as long as well tolerated and beneficial.  Can use a cool ice bath for the entire right foot due to the amount of swelling, 20 minute intervals.  Gradually increase amount of ice/cool temperature as long as well tolerated and beneficial  Additionally we discussed use of over-the-counter Voltaren gel 3 times daily  And reviewed at length with patient how to use a combination of the Voltaren gel and 4% Light a cane patch over-the-counter every 12 hours  Utilizing this patch would be done day and  night, treating the nerve 24/7 for best results  We discussed wide appropriate tennis shoes with thick sole for shock absorption, example [place]  These shoes should be worn at all times indoors and out, shoes applied at bedside in the morning  Advised flat narrow shoe as an aggravating factor inappropriate shoes need to be worn in the house as well  We discussed compression, ankle sleeve or compression sock to help with foot and ankle swelling  Apply in the morning and removed at night as long as well tolerated and beneficial  She can apply ice over compression and Voltaren gel and lidocaine patch should be on under compression sleeve or sock  If uncomfortable immediately remove any compression  Discussed IM steroid injection, effectiveness in decreasing inflammation, potential side effects and length of time injection may be beneficial if she is not having significant improvement in 2 weeks  Patient was in understanding and agreement with treatment plan, like to try conservative treatments 1st  I counseled the patient on their conditions, implications and medical management.  Instructed patient to contact the office with any changes, questions, concerns, worsening of symptoms.   Total face to face time 30 minutes, exam, assessment, treatment, discussion, additional time for review of chart prior to and following appointment and visit documentation, consultation and coordination of care.    Follow up 2 weeks    This note was created using [employer] voice recognition software that occasionally misinterpreted phrases or words.         [1]   Current Outpatient Medications   Medication Sig Dispense Refill    aspirin (ECOTRIN) 81 MG EC tablet Take 1 tablet by mouth once daily.      beta-carotene,A,-vits C,E/mins (OCUVITE ORAL) Take by mouth.      cyclobenzaprine (FLEXERIL) 5 MG tablet Take 5 mg by mouth nightly as needed.      cycloSPORINE (CEQUA) 0.09 % Dpet       diclofenac sodium (VOLTAREN) 1 % Gel Apply 2 g topically 4  (four) times daily. 20 g 2    fluticasone propionate (FLONASE) 50 mcg/actuation nasal spray 1 spray by Each Nostril route.      ibuprofen (ADVIL,MOTRIN) 600 MG tablet Take 1 tablet by mouth every 8 (eight) hours as needed.      irbesartan (AVAPRO) 150 MG tablet Take 75 mg by mouth once daily.       magnesium 250 mg Tab Take by mouth 2 (two) times daily.      multivit with calcium,iron,min (MULTIPLE VITAMIN, WOMENS ORAL) once daily.      MV-MINERALS/FA/OMEGA 3,6,9 #3 (WOMEN'S 50+ ADVANCED ORAL) Take 1 tablet by mouth once daily.      omeprazole (PRILOSEC) 40 MG capsule omeprazole 40 mg capsule,delayed release      polycarbophil (FIBERCON) 625 mg tablet Take 625 mg by mouth once daily.      sertraline (ZOLOFT) 100 MG tablet Take 1 tablet by mouth once daily.      tizanidine (ZANAFLEX) 4 MG tablet Take 1 tablet by mouth 3 (three) times daily.      valacyclovir (VALTREX) 500 MG tablet Take 1 tablet by mouth once daily.      varenicline tartrate (TYRVAYA) 0.03 mg/spray sprm       doxycycline (VIBRAMYCIN) 100 MG Cap  (Patient not taking: Reported on 4/8/2025)      TURMERIC, BULK, MISC 500mg daily (Patient not taking: Reported on 4/8/2025)       No current facility-administered medications for this visit.